# Patient Record
Sex: MALE | Race: WHITE | URBAN - METROPOLITAN AREA
[De-identification: names, ages, dates, MRNs, and addresses within clinical notes are randomized per-mention and may not be internally consistent; named-entity substitution may affect disease eponyms.]

---

## 2019-10-29 ENCOUNTER — HOSPITAL ENCOUNTER (EMERGENCY)
Age: 39
Discharge: HOME | End: 2019-10-29
Payer: COMMERCIAL

## 2019-10-29 VITALS
OXYGEN SATURATION: 95 % | HEART RATE: 98 BPM | TEMPERATURE: 98.2 F | DIASTOLIC BLOOD PRESSURE: 82 MMHG | SYSTOLIC BLOOD PRESSURE: 130 MMHG | RESPIRATION RATE: 18 BRPM

## 2019-10-29 DIAGNOSIS — L08.9 INFECTED LACERATION: Primary | ICD-10-CM

## 2019-10-29 DIAGNOSIS — Z23 NEED FOR TETANUS BOOSTER: ICD-10-CM

## 2019-10-29 DIAGNOSIS — T14.8XXA INFECTED LACERATION: Primary | ICD-10-CM

## 2019-10-29 PROCEDURE — 6360000002 HC RX W HCPCS: Performed by: PHYSICIAN ASSISTANT

## 2019-10-29 PROCEDURE — 90471 IMMUNIZATION ADMIN: CPT | Performed by: PHYSICIAN ASSISTANT

## 2019-10-29 PROCEDURE — 90715 TDAP VACCINE 7 YRS/> IM: CPT | Performed by: PHYSICIAN ASSISTANT

## 2019-10-29 PROCEDURE — 6370000000 HC RX 637 (ALT 250 FOR IP): Performed by: PHYSICIAN ASSISTANT

## 2019-10-29 PROCEDURE — 99282 EMERGENCY DEPT VISIT SF MDM: CPT

## 2019-10-29 RX ORDER — CEPHALEXIN 500 MG/1
500 CAPSULE ORAL 4 TIMES DAILY
Qty: 40 CAPSULE | Refills: 0 | Status: SHIPPED | OUTPATIENT
Start: 2019-10-29 | End: 2019-11-08

## 2019-10-29 RX ORDER — IBUPROFEN 800 MG/1
800 TABLET ORAL ONCE
Status: COMPLETED | OUTPATIENT
Start: 2019-10-29 | End: 2019-10-29

## 2019-10-29 RX ORDER — CEPHALEXIN 250 MG/1
500 CAPSULE ORAL ONCE
Status: COMPLETED | OUTPATIENT
Start: 2019-10-29 | End: 2019-10-29

## 2019-10-29 RX ORDER — SULFAMETHOXAZOLE AND TRIMETHOPRIM 800; 160 MG/1; MG/1
1 TABLET ORAL 2 TIMES DAILY
Qty: 20 TABLET | Refills: 0 | Status: SHIPPED | OUTPATIENT
Start: 2019-10-29 | End: 2019-11-08

## 2019-10-29 RX ORDER — SULFAMETHOXAZOLE AND TRIMETHOPRIM 800; 160 MG/1; MG/1
1 TABLET ORAL ONCE
Status: COMPLETED | OUTPATIENT
Start: 2019-10-29 | End: 2019-10-29

## 2019-10-29 RX ADMIN — IBUPROFEN 800 MG: 800 TABLET, FILM COATED ORAL at 16:17

## 2019-10-29 RX ADMIN — TETANUS TOXOID, REDUCED DIPHTHERIA TOXOID AND ACELLULAR PERTUSSIS VACCINE, ADSORBED 0.5 ML: 5; 2.5; 8; 8; 2.5 SUSPENSION INTRAMUSCULAR at 16:05

## 2019-10-29 RX ADMIN — SULFAMETHOXAZOLE AND TRIMETHOPRIM 1 TABLET: 800; 160 TABLET ORAL at 16:05

## 2019-10-29 RX ADMIN — CEPHALEXIN 500 MG: 250 CAPSULE ORAL at 16:05

## 2019-10-29 ASSESSMENT — PAIN SCALES - GENERAL
PAINLEVEL_OUTOF10: 5
PAINLEVEL_OUTOF10: 5

## 2019-10-29 ASSESSMENT — ENCOUNTER SYMPTOMS
NAUSEA: 0
COUGH: 0
WHEEZING: 0
VOMITING: 0
ABDOMINAL PAIN: 0
RHINORRHEA: 0
SHORTNESS OF BREATH: 0
DIARRHEA: 0

## 2019-10-29 ASSESSMENT — PAIN DESCRIPTION - PAIN TYPE: TYPE: CHRONIC PAIN;ACUTE PAIN

## 2020-04-24 ENCOUNTER — HOSPITAL ENCOUNTER (EMERGENCY)
Age: 40
Discharge: HOME | End: 2020-04-24
Attending: EMERGENCY MEDICINE
Payer: COMMERCIAL

## 2020-04-24 VITALS
BODY MASS INDEX: 29.8 KG/M2 | OXYGEN SATURATION: 99 % | HEIGHT: 72 IN | HEART RATE: 88 BPM | TEMPERATURE: 97.9 F | DIASTOLIC BLOOD PRESSURE: 95 MMHG | WEIGHT: 220 LBS | SYSTOLIC BLOOD PRESSURE: 167 MMHG | RESPIRATION RATE: 18 BRPM

## 2020-04-24 PROCEDURE — 99282 EMERGENCY DEPT VISIT SF MDM: CPT

## 2020-04-24 ASSESSMENT — ENCOUNTER SYMPTOMS
RHINORRHEA: 0
DIARRHEA: 0
NAUSEA: 0
COUGH: 0
VOMITING: 0
SHORTNESS OF BREATH: 0
ABDOMINAL PAIN: 0

## 2020-04-25 NOTE — ED PROVIDER NOTES
range of motion and neck supple. Trachea: No tracheal deviation. Cardiovascular:      Rate and Rhythm: Normal rate and regular rhythm. Heart sounds: No murmur. Pulmonary:      Effort: Pulmonary effort is normal. No respiratory distress. Breath sounds: Normal breath sounds. No wheezing or rales. Abdominal:      General: Bowel sounds are normal. There is no distension. Palpations: Abdomen is soft. Tenderness: There is no abdominal tenderness. There is no guarding. Musculoskeletal: Normal range of motion. Skin:     General: Skin is warm and dry. Neurological:      Mental Status: He is alert and oriented to person, place, and time. GCS: GCS eye subscore is 4. GCS verbal subscore is 5. GCS motor subscore is 6. Psychiatric:         Mood and Affect: Mood and affect normal.         Speech: Speech normal.         Behavior: Behavior normal. Behavior is cooperative. Thought Content: Thought content normal.         Cognition and Memory: Cognition normal.         Judgment: Judgment normal.         DIAGNOSTIC RESULTS   LABS:    Labs Reviewed - No data to display    All other labs were within normal range or not returned as of this dictation. EKG: All EKG's are interpreted by the Emergency Department Physician in the absence of a cardiologist.  Please see their note for interpretation of EKG. RADIOLOGY:   Non-plain film images such as CT, Ultrasound and MRI are read by the radiologist. Plain radiographic images are visualized and preliminarily interpreted by the ED Provider with the below findings:        Interpretation per the Radiologist below, if available at the time of this note:    No orders to display     No results found.         PROCEDURES   Unless otherwise noted below, none     Procedures    CRITICAL CARE TIME   N/A    CONSULTS:  None      EMERGENCY DEPARTMENT COURSE and DIFFERENTIAL DIAGNOSIS/MDM:   Vitals:    Vitals:    04/24/20 2024   BP: (!) 167/95   Pulse:

## 2020-05-24 ENCOUNTER — APPOINTMENT (OUTPATIENT)
Dept: GENERAL RADIOLOGY | Age: 40
End: 2020-05-24
Payer: COMMERCIAL

## 2020-05-24 ENCOUNTER — APPOINTMENT (OUTPATIENT)
Dept: CT IMAGING | Age: 40
End: 2020-05-24
Payer: COMMERCIAL

## 2020-05-24 ENCOUNTER — HOSPITAL ENCOUNTER (EMERGENCY)
Age: 40
Discharge: PSYCHIATRIC OR SUBSTANCE USE FACILITY | End: 2020-05-25
Attending: EMERGENCY MEDICINE
Payer: COMMERCIAL

## 2020-05-24 LAB
A/G RATIO: 1.6 (ref 1.1–2.2)
ACETAMINOPHEN LEVEL: <5 UG/ML (ref 10–30)
ALBUMIN SERPL-MCNC: 4.2 G/DL (ref 3.4–5)
ALP BLD-CCNC: 52 U/L (ref 40–129)
ALT SERPL-CCNC: 26 U/L (ref 10–40)
AMPHETAMINE SCREEN, URINE: NORMAL
ANION GAP SERPL CALCULATED.3IONS-SCNC: 8 MMOL/L (ref 3–16)
AST SERPL-CCNC: 21 U/L (ref 15–37)
BARBITURATE SCREEN URINE: NORMAL
BASOPHILS ABSOLUTE: 0 K/UL (ref 0–0.2)
BASOPHILS RELATIVE PERCENT: 0.5 %
BENZODIAZEPINE SCREEN, URINE: NORMAL
BILIRUB SERPL-MCNC: <0.2 MG/DL (ref 0–1)
BILIRUBIN URINE: NEGATIVE
BLOOD, URINE: NEGATIVE
BUN BLDV-MCNC: 19 MG/DL (ref 7–20)
CALCIUM SERPL-MCNC: 9.2 MG/DL (ref 8.3–10.6)
CANNABINOID SCREEN URINE: NORMAL
CHLORIDE BLD-SCNC: 104 MMOL/L (ref 99–110)
CLARITY: CLEAR
CO2: 24 MMOL/L (ref 21–32)
COCAINE METABOLITE SCREEN URINE: NORMAL
COLOR: YELLOW
CREAT SERPL-MCNC: 0.9 MG/DL (ref 0.9–1.3)
EOSINOPHILS ABSOLUTE: 0.7 K/UL (ref 0–0.6)
EOSINOPHILS RELATIVE PERCENT: 6.7 %
ETHANOL: NORMAL MG/DL (ref 0–0.08)
GFR AFRICAN AMERICAN: >60
GFR NON-AFRICAN AMERICAN: >60
GLOBULIN: 2.7 G/DL
GLUCOSE BLD-MCNC: 95 MG/DL (ref 70–99)
GLUCOSE URINE: NEGATIVE MG/DL
HCT VFR BLD CALC: 46.5 % (ref 40.5–52.5)
HEMOGLOBIN: 16.2 G/DL (ref 13.5–17.5)
KETONES, URINE: NEGATIVE MG/DL
LEUKOCYTE ESTERASE, URINE: NEGATIVE
LYMPHOCYTES ABSOLUTE: 1.7 K/UL (ref 1–5.1)
LYMPHOCYTES RELATIVE PERCENT: 17 %
Lab: NORMAL
MCH RBC QN AUTO: 31.6 PG (ref 26–34)
MCHC RBC AUTO-ENTMCNC: 34.9 G/DL (ref 31–36)
MCV RBC AUTO: 90.6 FL (ref 80–100)
METHADONE SCREEN, URINE: NORMAL
MICROSCOPIC EXAMINATION: NORMAL
MONOCYTES ABSOLUTE: 0.6 K/UL (ref 0–1.3)
MONOCYTES RELATIVE PERCENT: 5.8 %
NEUTROPHILS ABSOLUTE: 7.1 K/UL (ref 1.7–7.7)
NEUTROPHILS RELATIVE PERCENT: 70 %
NITRITE, URINE: NEGATIVE
OPIATE SCREEN URINE: NORMAL
OXYCODONE URINE: NORMAL
PDW BLD-RTO: 12.9 % (ref 12.4–15.4)
PH UA: 6.5
PH UA: 6.5 (ref 5–8)
PHENCYCLIDINE SCREEN URINE: NORMAL
PLATELET # BLD: 170 K/UL (ref 135–450)
PMV BLD AUTO: 8.7 FL (ref 5–10.5)
POTASSIUM SERPL-SCNC: 4.1 MMOL/L (ref 3.5–5.1)
PROPOXYPHENE SCREEN: NORMAL
PROTEIN UA: NEGATIVE MG/DL
RBC # BLD: 5.13 M/UL (ref 4.2–5.9)
SALICYLATE, SERUM: <0.3 MG/DL (ref 15–30)
SODIUM BLD-SCNC: 136 MMOL/L (ref 136–145)
SPECIFIC GRAVITY UA: 1.02 (ref 1–1.03)
TOTAL PROTEIN: 6.9 G/DL (ref 6.4–8.2)
URINE REFLEX TO CULTURE: NORMAL
URINE TYPE: NORMAL
UROBILINOGEN, URINE: 0.2 E.U./DL
WBC # BLD: 10.2 K/UL (ref 4–11)

## 2020-05-24 PROCEDURE — 85025 COMPLETE CBC W/AUTO DIFF WBC: CPT

## 2020-05-24 PROCEDURE — 80053 COMPREHEN METABOLIC PANEL: CPT

## 2020-05-24 PROCEDURE — G0480 DRUG TEST DEF 1-7 CLASSES: HCPCS

## 2020-05-24 PROCEDURE — 70450 CT HEAD/BRAIN W/O DYE: CPT

## 2020-05-24 PROCEDURE — 99285 EMERGENCY DEPT VISIT HI MDM: CPT

## 2020-05-24 PROCEDURE — 36415 COLL VENOUS BLD VENIPUNCTURE: CPT

## 2020-05-24 PROCEDURE — 80307 DRUG TEST PRSMV CHEM ANLYZR: CPT

## 2020-05-24 PROCEDURE — 93005 ELECTROCARDIOGRAM TRACING: CPT | Performed by: PHYSICIAN ASSISTANT

## 2020-05-24 PROCEDURE — 81003 URINALYSIS AUTO W/O SCOPE: CPT

## 2020-05-24 PROCEDURE — 71045 X-RAY EXAM CHEST 1 VIEW: CPT

## 2020-05-24 ASSESSMENT — ENCOUNTER SYMPTOMS
VOMITING: 0
DIARRHEA: 0
NAUSEA: 0
SHORTNESS OF BREATH: 0
WHEEZING: 0
ABDOMINAL PAIN: 0
COUGH: 0
RHINORRHEA: 0

## 2020-05-25 VITALS
BODY MASS INDEX: 29.8 KG/M2 | OXYGEN SATURATION: 97 % | WEIGHT: 220 LBS | HEART RATE: 71 BPM | RESPIRATION RATE: 16 BRPM | TEMPERATURE: 98.2 F | HEIGHT: 72 IN | DIASTOLIC BLOOD PRESSURE: 82 MMHG | SYSTOLIC BLOOD PRESSURE: 124 MMHG

## 2020-05-25 LAB
EKG ATRIAL RATE: 87 BPM
EKG DIAGNOSIS: NORMAL
EKG P AXIS: 53 DEGREES
EKG P-R INTERVAL: 144 MS
EKG Q-T INTERVAL: 358 MS
EKG QRS DURATION: 88 MS
EKG QTC CALCULATION (BAZETT): 430 MS
EKG R AXIS: 42 DEGREES
EKG T AXIS: 42 DEGREES
EKG VENTRICULAR RATE: 87 BPM

## 2020-05-25 PROCEDURE — 93010 ELECTROCARDIOGRAM REPORT: CPT | Performed by: INTERNAL MEDICINE

## 2020-05-25 NOTE — ED NOTES
Pt resting comfortably in bed, room safe, VSS,  at bedside, will continue to monitor.       Holli Encarnacion RN  05/24/20 4678

## 2020-05-25 NOTE — ED NOTES
Pt resting comfortably in bed, room safe, VSS,  at bedside, will continue to monitor.       Holli Encarnacion RN  05/25/20 4438

## 2020-05-25 NOTE — ED PROVIDER NOTES
methamphetamines. He denies any current illicit drug use. He has no physical complaints or other concerns at this time. Nursing Notes were all reviewed and agreed with or any disagreements were addressed in the HPI. REVIEW OF SYSTEMS    (2-9 systems for level 4, 10 or more for level 5)     Review of Systems   Unable to perform ROS: Mental status change   Constitutional: Negative for appetite change, chills and fever. HENT: Negative for congestion and rhinorrhea. Respiratory: Negative for cough, shortness of breath and wheezing. Cardiovascular: Negative for chest pain. Gastrointestinal: Negative for abdominal pain, diarrhea, nausea and vomiting. Genitourinary: Negative for difficulty urinating, dysuria and hematuria. Musculoskeletal: Negative for neck pain and neck stiffness. Skin: Negative for rash. Neurological: Negative for headaches. Positives and Pertinent negatives as per HPI. Except as noted above in the ROS, all other systems were reviewed and negative. PAST MEDICAL HISTORY   No past medical history on file. SURGICAL HISTORY     Past Surgical History:   Procedure Laterality Date    KNEE SURGERY           CURRENTMEDICATIONS       Previous Medications    DICYCLOMINE (BENTYL) 10 MG CAPSULE    Take 1 capsule by mouth 4 times daily (before meals and nightly) for 20 doses. ALLERGIES     Patient has no known allergies. FAMILYHISTORY     No family history on file.        SOCIAL HISTORY       Social History     Tobacco Use    Smoking status: Current Every Day Smoker     Packs/day: 0.50    Smokeless tobacco: Never Used   Substance Use Topics    Alcohol use: Yes     Comment: drinks five days a week - 12 pk beer    Drug use: No     Comment: 'today was first time with heroin'       SCREENINGS             PHYSICAL EXAM    (up to 7 for level 4, 8 or more for level 5)     ED Triage Vitals [05/24/20 2026]   BP Temp Temp Source Pulse Resp SpO2 Height Weight   (!) 154/102 98.3 °F (36.8 °C) Oral 92 16 96 % 6' (1.829 m) 220 lb (99.8 kg)       Physical Exam  Vitals signs and nursing note reviewed. Constitutional:       General: He is not in acute distress. Appearance: He is well-developed. He is not ill-appearing, toxic-appearing or diaphoretic. HENT:      Head: Normocephalic and atraumatic. Right Ear: External ear normal.      Left Ear: External ear normal.      Nose: Nose normal.   Eyes:      General:         Right eye: No discharge. Left eye: No discharge. Conjunctiva/sclera: Conjunctivae normal.      Pupils: Pupils are equal, round, and reactive to light. Neck:      Musculoskeletal: Normal range of motion and neck supple. Cardiovascular:      Rate and Rhythm: Normal rate and regular rhythm. Heart sounds: Normal heart sounds. Pulmonary:      Effort: Pulmonary effort is normal. No respiratory distress. Breath sounds: Normal breath sounds. Chest:      Chest wall: No tenderness. Abdominal:      General: Bowel sounds are normal. There is no distension. Palpations: Abdomen is soft. Tenderness: There is no abdominal tenderness. Musculoskeletal: Normal range of motion. Skin:     General: Skin is warm and dry. Neurological:      Mental Status: He is alert and oriented to person, place, and time. GCS: GCS eye subscore is 4. GCS verbal subscore is 5. GCS motor subscore is 6. Psychiatric:         Attention and Perception: He perceives auditory hallucinations. Speech: Speech is rapid and pressured and tangential.         Behavior: Behavior normal.         Thought Content: Thought content is paranoid and delusional. Thought content does not include homicidal or suicidal ideation. Thought content does not include homicidal or suicidal plan.          DIAGNOSTIC RESULTS   LABS:    Labs Reviewed   CBC WITH AUTO DIFFERENTIAL - Abnormal; Notable for the following components:       Result Value    Eosinophils Absolute 1. Paranoid psychosis ([facility] [address]    2. Auditory hallucination          DISPOSITION/PLAN   DISPOSITION Decision To Transfer 05/24/2020 09:57:16 PM      PATIENT REFERREDTO:  No follow-up provider specified.     DISCHARGE MEDICATIONS:  New Prescriptions    No medications on file       DISCONTINUED MEDICATIONS:  Discontinued Medications    No medications on file              (Please note that portions of this note were completed with a voice recognition program.  Efforts were made to edit the dictations but occasionally words are mis-transcribed.)    Blanche Estevez PA-C (electronically signed)           Bj Moulton PA-C  05/24/20 4733

## 2020-05-25 NOTE — ED NOTES
Pt resting comfortably in bed, room safe, VSS,  at bedside, will continue to monitor.       Chelsey Quinteros RN  05/25/20 6283

## 2020-11-06 ENCOUNTER — HOSPITAL ENCOUNTER (OUTPATIENT)
Age: 40
Discharge: HOME | End: 2020-11-06
Payer: COMMERCIAL

## 2020-11-06 LAB
A/G RATIO: 1.6 (ref 1.1–2.2)
ALBUMIN SERPL-MCNC: 4.4 G/DL (ref 3.4–5)
ALP BLD-CCNC: 51 U/L (ref 40–129)
ALT SERPL-CCNC: 20 U/L (ref 10–40)
ANION GAP SERPL CALCULATED.3IONS-SCNC: 9 MMOL/L (ref 3–16)
AST SERPL-CCNC: 16 U/L (ref 15–37)
BASOPHILS ABSOLUTE: 0.1 K/UL (ref 0–0.2)
BASOPHILS RELATIVE PERCENT: 0.6 %
BILIRUB SERPL-MCNC: <0.2 MG/DL (ref 0–1)
BUN BLDV-MCNC: 12 MG/DL (ref 7–20)
CALCIUM SERPL-MCNC: 9 MG/DL (ref 8.3–10.6)
CHLORIDE BLD-SCNC: 103 MMOL/L (ref 99–110)
CO2: 24 MMOL/L (ref 21–32)
CREAT SERPL-MCNC: 0.7 MG/DL (ref 0.9–1.3)
EOSINOPHILS ABSOLUTE: 0.2 K/UL (ref 0–0.6)
EOSINOPHILS RELATIVE PERCENT: 2.7 %
GFR AFRICAN AMERICAN: >60
GFR NON-AFRICAN AMERICAN: >60
GLOBULIN: 2.7 G/DL
GLUCOSE BLD-MCNC: 99 MG/DL (ref 70–99)
HCT VFR BLD CALC: 44.6 % (ref 40.5–52.5)
HEMOGLOBIN: 15.8 G/DL (ref 13.5–17.5)
LYMPHOCYTES ABSOLUTE: 1 K/UL (ref 1–5.1)
LYMPHOCYTES RELATIVE PERCENT: 11.2 %
MCH RBC QN AUTO: 31.6 PG (ref 26–34)
MCHC RBC AUTO-ENTMCNC: 35.5 G/DL (ref 31–36)
MCV RBC AUTO: 89.1 FL (ref 80–100)
MONOCYTES ABSOLUTE: 0.3 K/UL (ref 0–1.3)
MONOCYTES RELATIVE PERCENT: 3.6 %
NEUTROPHILS ABSOLUTE: 7 K/UL (ref 1.7–7.7)
NEUTROPHILS RELATIVE PERCENT: 81.9 %
PDW BLD-RTO: 13.1 % (ref 12.4–15.4)
PLATELET # BLD: 170 K/UL (ref 135–450)
PMV BLD AUTO: 9.1 FL (ref 5–10.5)
POTASSIUM SERPL-SCNC: 4.1 MMOL/L (ref 3.5–5.1)
RBC # BLD: 5.01 M/UL (ref 4.2–5.9)
SODIUM BLD-SCNC: 136 MMOL/L (ref 136–145)
TOTAL PROTEIN: 7.1 G/DL (ref 6.4–8.2)
WBC # BLD: 8.5 K/UL (ref 4–11)

## 2020-11-06 PROCEDURE — 80053 COMPREHEN METABOLIC PANEL: CPT

## 2020-11-06 PROCEDURE — 85025 COMPLETE CBC W/AUTO DIFF WBC: CPT

## 2020-11-06 PROCEDURE — 36415 COLL VENOUS BLD VENIPUNCTURE: CPT

## 2020-12-14 ENCOUNTER — HOSPITAL ENCOUNTER (INPATIENT)
Age: 40
LOS: 3 days | Discharge: HOME | DRG: 750 | End: 2020-12-17
Attending: PSYCHIATRY & NEUROLOGY | Admitting: PSYCHIATRY & NEUROLOGY
Payer: COMMERCIAL

## 2020-12-14 ENCOUNTER — HOSPITAL ENCOUNTER (EMERGENCY)
Age: 40
Discharge: PSYCHIATRIC OR SUBSTANCE USE FACILITY | End: 2020-12-14
Attending: EMERGENCY MEDICINE
Payer: COMMERCIAL

## 2020-12-14 VITALS
RESPIRATION RATE: 16 BRPM | DIASTOLIC BLOOD PRESSURE: 79 MMHG | WEIGHT: 225 LBS | BODY MASS INDEX: 29.82 KG/M2 | HEART RATE: 103 BPM | SYSTOLIC BLOOD PRESSURE: 131 MMHG | OXYGEN SATURATION: 94 % | HEIGHT: 73 IN | TEMPERATURE: 97.7 F

## 2020-12-14 PROBLEM — F29 PSYCHOSIS (HCC): Status: ACTIVE | Noted: 2020-12-14

## 2020-12-14 LAB
A/G RATIO: 1.5 (ref 1.1–2.2)
ACETAMINOPHEN LEVEL: <5 UG/ML (ref 10–30)
ALBUMIN SERPL-MCNC: 5 G/DL (ref 3.4–5)
ALP BLD-CCNC: 73 U/L (ref 40–129)
ALT SERPL-CCNC: 23 U/L (ref 10–40)
ANION GAP SERPL CALCULATED.3IONS-SCNC: 13 MMOL/L (ref 3–16)
AST SERPL-CCNC: 24 U/L (ref 15–37)
BASOPHILS ABSOLUTE: 0.1 K/UL (ref 0–0.2)
BASOPHILS RELATIVE PERCENT: 0.5 %
BILIRUB SERPL-MCNC: 1.1 MG/DL (ref 0–1)
BUN BLDV-MCNC: 12 MG/DL (ref 7–20)
CALCIUM SERPL-MCNC: 10 MG/DL (ref 8.3–10.6)
CHLORIDE BLD-SCNC: 98 MMOL/L (ref 99–110)
CO2: 23 MMOL/L (ref 21–32)
CREAT SERPL-MCNC: 0.8 MG/DL (ref 0.9–1.3)
EOSINOPHILS ABSOLUTE: 0.3 K/UL (ref 0–0.6)
EOSINOPHILS RELATIVE PERCENT: 2.9 %
ETHANOL: NORMAL MG/DL (ref 0–0.08)
GFR AFRICAN AMERICAN: >60
GFR NON-AFRICAN AMERICAN: >60
GLOBULIN: 3.3 G/DL
GLUCOSE BLD-MCNC: 117 MG/DL (ref 70–99)
HCT VFR BLD CALC: 46.8 % (ref 40.5–52.5)
HEMOGLOBIN: 16.4 G/DL (ref 13.5–17.5)
LYMPHOCYTES ABSOLUTE: 1.2 K/UL (ref 1–5.1)
LYMPHOCYTES RELATIVE PERCENT: 10.5 %
MCH RBC QN AUTO: 30.5 PG (ref 26–34)
MCHC RBC AUTO-ENTMCNC: 35 G/DL (ref 31–36)
MCV RBC AUTO: 87 FL (ref 80–100)
MONOCYTES ABSOLUTE: 0.8 K/UL (ref 0–1.3)
MONOCYTES RELATIVE PERCENT: 6.6 %
NEUTROPHILS ABSOLUTE: 9.1 K/UL (ref 1.7–7.7)
NEUTROPHILS RELATIVE PERCENT: 79.5 %
PDW BLD-RTO: 12.9 % (ref 12.4–15.4)
PLATELET # BLD: 192 K/UL (ref 135–450)
PMV BLD AUTO: 8.4 FL (ref 5–10.5)
POTASSIUM REFLEX MAGNESIUM: 3.7 MMOL/L (ref 3.5–5.1)
RBC # BLD: 5.38 M/UL (ref 4.2–5.9)
REASON FOR REJECTION: NORMAL
REJECTED TEST: NORMAL
SALICYLATE, SERUM: 0.4 MG/DL (ref 15–30)
SARS-COV-2, NAAT: NOT DETECTED
SODIUM BLD-SCNC: 134 MMOL/L (ref 136–145)
TOTAL PROTEIN: 8.3 G/DL (ref 6.4–8.2)
WBC # BLD: 11.5 K/UL (ref 4–11)

## 2020-12-14 PROCEDURE — 80053 COMPREHEN METABOLIC PANEL: CPT

## 2020-12-14 PROCEDURE — 80061 LIPID PANEL: CPT

## 2020-12-14 PROCEDURE — 36415 COLL VENOUS BLD VENIPUNCTURE: CPT

## 2020-12-14 PROCEDURE — 1240000000 HC EMOTIONAL WELLNESS R&B

## 2020-12-14 PROCEDURE — 85025 COMPLETE CBC W/AUTO DIFF WBC: CPT

## 2020-12-14 PROCEDURE — 93005 ELECTROCARDIOGRAM TRACING: CPT | Performed by: EMERGENCY MEDICINE

## 2020-12-14 PROCEDURE — 6360000002 HC RX W HCPCS: Performed by: EMERGENCY MEDICINE

## 2020-12-14 PROCEDURE — G0480 DRUG TEST DEF 1-7 CLASSES: HCPCS

## 2020-12-14 PROCEDURE — 99285 EMERGENCY DEPT VISIT HI MDM: CPT

## 2020-12-14 PROCEDURE — 83036 HEMOGLOBIN GLYCOSYLATED A1C: CPT

## 2020-12-14 PROCEDURE — 96372 THER/PROPH/DIAG INJ SC/IM: CPT

## 2020-12-14 PROCEDURE — 6370000000 HC RX 637 (ALT 250 FOR IP): Performed by: PSYCHIATRY & NEUROLOGY

## 2020-12-14 PROCEDURE — U0002 COVID-19 LAB TEST NON-CDC: HCPCS

## 2020-12-14 PROCEDURE — 84443 ASSAY THYROID STIM HORMONE: CPT

## 2020-12-14 RX ORDER — RISPERIDONE 1 MG/1
1 TABLET, FILM COATED ORAL 2 TIMES DAILY
Status: DISCONTINUED | OUTPATIENT
Start: 2020-12-14 | End: 2020-12-15

## 2020-12-14 RX ORDER — NICOTINE 21 MG/24HR
1 PATCH, TRANSDERMAL 24 HOURS TRANSDERMAL DAILY
Status: DISCONTINUED | OUTPATIENT
Start: 2020-12-15 | End: 2020-12-17 | Stop reason: HOSPADM

## 2020-12-14 RX ORDER — IBUPROFEN 400 MG/1
400 TABLET ORAL EVERY 6 HOURS PRN
Status: DISCONTINUED | OUTPATIENT
Start: 2020-12-14 | End: 2020-12-17 | Stop reason: HOSPADM

## 2020-12-14 RX ORDER — RISPERIDONE 1 MG/1
TABLET, FILM COATED ORAL
Status: ON HOLD | COMMUNITY
Start: 2020-09-10 | End: 2020-12-17 | Stop reason: HOSPADM

## 2020-12-14 RX ORDER — DROPERIDOL 2.5 MG/ML
5 INJECTION, SOLUTION INTRAMUSCULAR; INTRAVENOUS EVERY 6 HOURS PRN
Status: DISCONTINUED | OUTPATIENT
Start: 2020-12-14 | End: 2020-12-14 | Stop reason: HOSPADM

## 2020-12-14 RX ORDER — POLYETHYLENE GLYCOL 3350 17 G
2 POWDER IN PACKET (EA) ORAL
Status: DISCONTINUED | OUTPATIENT
Start: 2020-12-14 | End: 2020-12-17 | Stop reason: HOSPADM

## 2020-12-14 RX ORDER — MIDAZOLAM HYDROCHLORIDE 2 MG/2ML
2 INJECTION, SOLUTION INTRAMUSCULAR; INTRAVENOUS ONCE
Status: COMPLETED | OUTPATIENT
Start: 2020-12-14 | End: 2020-12-14

## 2020-12-14 RX ORDER — ACETAMINOPHEN 325 MG/1
650 TABLET ORAL EVERY 4 HOURS PRN
Status: DISCONTINUED | OUTPATIENT
Start: 2020-12-14 | End: 2020-12-17 | Stop reason: HOSPADM

## 2020-12-14 RX ORDER — TRAZODONE HYDROCHLORIDE 50 MG/1
50 TABLET ORAL NIGHTLY PRN
Status: DISCONTINUED | OUTPATIENT
Start: 2020-12-14 | End: 2020-12-17 | Stop reason: HOSPADM

## 2020-12-14 RX ADMIN — DROPERIDOL 5 MG: 2.5 INJECTION, SOLUTION INTRAMUSCULAR; INTRAVENOUS at 07:44

## 2020-12-14 RX ADMIN — MIDAZOLAM 2 MG: 1 INJECTION INTRAMUSCULAR; INTRAVENOUS at 07:45

## 2020-12-14 RX ADMIN — RISPERIDONE 1 MG: 1 TABLET ORAL at 22:45

## 2020-12-14 ASSESSMENT — SLEEP AND FATIGUE QUESTIONNAIRES
DIFFICULTY FALLING ASLEEP: YES
DO YOU HAVE DIFFICULTY SLEEPING: YES
DIFFICULTY STAYING ASLEEP: NO
SLEEP PATTERN: DIFFICULTY FALLING ASLEEP
DO YOU USE A SLEEP AID: NO
RESTFUL SLEEP: YES
DIFFICULTY ARISING: NO
AVERAGE NUMBER OF SLEEP HOURS: 10

## 2020-12-14 ASSESSMENT — LIFESTYLE VARIABLES: HISTORY_ALCOHOL_USE: NO

## 2020-12-14 NOTE — ED PROVIDER NOTES
eMERGENCY dEPARTMENT eNCOUnter      PtName: Judye Kayser  MRN: [id number]  [place] 1980  Date of evaluation: 12/14/2020  Provider: Jas Ferro DO     CHIEF COMPLAINT       Chief Complaint   Patient presents with   [address] police brought pt in because he states,\" I have a chip in my head and its torturing me. [employer] controls it. \"         HISTORY OF PRESENT ILLNESS   (Location/Symptom, Timing/Onset,Context/Setting, Quality, Duration, Modifying Factors, Severity) Note limiting factors. Judye Kayser is a 41 y.o. male who presents to the emergency department with chief complaint of hallucinations. History of prior. Unknown what medications he is on. He admits to having a chip in his head and having a direct line of communication with [employer]. He denies any visual hallucinations, suicidal ideations or homicidal ideations. History is limited secondary to patient's psychosis. Nursing Notes were reviewed. REVIEW OF SYSTEMS    (2+ forlevel 4; 10+ for level 5)     Review of Systems  See hpi for further details. Complete 10 point review of all systems performed and is otherwise negative unless noted above. PAST MEDICAL HISTORY     Past Medical History:   Diagnosis Date    Depression     Paranoia ([facility] [address]     Schizoaffective disorder, bipolar type ([facility] [address]        SURGICAL HISTORY       Past Surgical History:   Procedure Laterality Date    KNEE SURGERY         CURRENT MEDICATIONS       Previous Medications    No medications on file       ALLERGIES     Patient has no known allergies. FAMILY HISTORY     History reviewed. No pertinent family history.        SOCIAL HISTORY       Social History     Socioeconomic History    Marital status:      Spouse name: None    Number of children: None    Years of education: None    Highest education level: None   Occupational History    None   Social Needs    Financial resource strain: None    Food insecurity     Worry: None     Inability: None    Transportation needs     Medical: None     Non-medical: None   Tobacco Use    Smoking status: Current Every Day Smoker     Packs/day: 0.50    Smokeless tobacco: Never Used   Substance and Sexual Activity    Alcohol use: Yes     Comment: drinks five days a week - 12 pk beer    Drug use: Not Currently    Sexual activity: None   Lifestyle    Physical activity     Days per week: None     Minutes per session: None    Stress: None   Relationships    Social connections     Talks on phone: None     Gets together: None     Attends [employer] service: None     Active member of club or organization: None     Attends meetings of clubs or organizations: None     Relationship status: None    Intimate partner violence     Fear of current or ex partner: None     Emotionally abused: None     Physically abused: None     Forced sexual activity: None   Other Topics Concern    None   Social History Narrative    None       SCREENINGS    [place] Coma Scale  Eye Opening: Spontaneous  Best Verbal Response: Oriented  Best Motor Response: Obeys commands  [place] Coma Scale Score: 15      PHYSICAL EXAM    (5+ for level 4, 8+ for level 5)     ED Triage Vitals   BP Temp Temp src Pulse Resp SpO2 Height Weight   -- -- -- -- -- -- -- --       Physical Exam  Vitals signs and nursing note reviewed. Constitutional:       General: He is not in acute distress. Appearance: Normal appearance. He is well-developed. He is not ill-appearing, toxic-appearing or diaphoretic. HENT:      Head: Normocephalic and atraumatic. Right Ear: External ear normal.      Left Ear: External ear normal.      Nose: Nose normal.      Mouth/Throat:      Mouth: Mucous membranes are moist.      Pharynx: Oropharynx is clear. Eyes:      General: No scleral icterus. Right eye: No discharge. Left eye: No discharge.       Conjunctiva/sclera: Conjunctivae normal.      Pupils: Pupils are equal, round, and reactive to light. Neck:      Musculoskeletal: Normal range of motion and neck supple. Vascular: No JVD. Trachea: No tracheal deviation. Cardiovascular:      Pulses: Normal pulses. Pulmonary:      Effort: Pulmonary effort is normal. No respiratory distress. Breath sounds: No stridor. Abdominal:      General: Abdomen is flat. There is no distension. Palpations: Abdomen is soft. Musculoskeletal: Normal range of motion. General: No swelling, tenderness, deformity or signs of injury. Right lower leg: No edema. Left lower leg: No edema. Skin:     General: Skin is warm and dry. Capillary Refill: Capillary refill takes less than 2 seconds. Coloration: Skin is not jaundiced or pale. Findings: No bruising, erythema, lesion or rash. Neurological:      General: No focal deficit present. Mental Status: He is alert and oriented to person, place, and time. Cranial Nerves: No cranial nerve deficit. Sensory: No sensory deficit. Motor: No weakness or abnormal muscle tone. Coordination: Coordination normal.   Psychiatric:      Comments: Patient appears to be actively talking to a voice in his head. Appears to be actively psychotic. He is however calm and in bed. No suicidal or homicidal ideations. DIAGNOSTIC RESULTS     EKG: EKG interpretation by ED physician: Normal axis, normal sinus rhythm at a rate of 85 bpm. No ischemic ST changes. RADIOLOGY (Per Emergency Physician): Interpretation per the Radiologist below, if available at the time of this note:  No results found.     LABS:  Labs Reviewed   COMPREHENSIVE METABOLIC PANEL W/ REFLEX TO MG FOR LOW K - Abnormal; Notable for the following components:       Result Value    Sodium 134 (*)     Chloride 98 (*)     Glucose 117 (*)     CREATININE 0.8 (*)     Total Protein 8.3 (*)     Total Bilirubin 1.1 (*)     All other components within normal limits    Narrative: CALL  [facility] [phone],  Rejected Test Name/Called to: Joann Mtz rn, 12/14/2020 08:28, by [facility]  Performed at:  [facility]  [address],  [place], [address]   Phone [phone]   ACETAMINOPHEN LEVEL - Abnormal; Notable for the following components:    Acetaminophen Level <5 (*)     All other components within normal limits    Narrative:     Sissy John  [facility] tel. [phone],  Rejected Test Name/Called to: Jonan Mtz rn, 12/14/2020 08:28, by [facility]  Performed at:  [facility]  [address], [address]   Phone [phone]   SALICYLATE LEVEL - Abnormal; Notable for the following components:    Salicylate, Serum 0.4 (*)     All other components within normal limits    Narrative:     Sissy John  [facility] tel. [phone],  Rejected Test Name/Called to: Joann Mtz rn, 12/14/2020 08:28, by [facility]  Performed at:  [facility] [address],  [place], [address]   Phone [phone]   CBC WITH AUTO DIFFERENTIAL - Abnormal; Notable for the following components:    WBC 11.5 (*)     Neutrophils Absolute 9.1 (*)     All other components within normal limits    Narrative:     Performed at:  [facility] [address],  [place], [address]   Phone [phone]   ETHANOL    Narrative:     Boni Brunson tel. [phone],  Rejected Test Name/Called to: Joann Mtz rn, 12/14/2020 08:28, by [facility]  Performed at:  [facility]  [address], [address]   Phone [phone]    Narrative:     [phone] was cancelled on 12/14/2020 at 08:15 by [facility]; Cancelled: In-house  testing  Performed at:  [facility]  [address],  [place], [address]   Phone [phone]    Narrative:     Sissy [facility]CH[facility] tel. [phone],  Rejected Test Name/Called to: Joann Mtz rn, 12/14/2020 08:28, by [facility]  Performed at:  [facility] [address] [facility],  [place], [address]   Phone [phone]   [facility]       All other labs were within normal range or not returned as of this dictation. EMERGENCY DEPARTMENT COURSE and DIFFERENTIAL DIAGNOSIS/MDM:   Vitals:    Vitals:    12/14/20 0725   BP: (!) 146/101   Pulse: 108   Resp: 17   Temp: 97.7 °F (36.5 °C)   TempSrc: Oral   SpO2: 96%   Weight: 225 lb (102.1 kg)   Height: 6' 1\" (1.854 m)       Medications   droperidol (INAPSINE) injection 5 mg (5 mg Intramuscular Given 12/14/20 0744)   midazolam PF (VERSED) injection 2 mg (2 mg Intramuscular Given 12/14/20 0745)       MDM. Psych panel initiated. Due to patient's agitation droperidol and Versed was ordered. This seemed to improve the situation. Pink slip was filled out. Patient is medically clear for psych placement. Covid swab performed screening is negative. CRITICAL CARE TIME: 30 minutes excluding billable procedure time: Initiation of antipsychotics in a patient with acute agitation, multiple reevaluations, complex MDM, review and interpretation of results, charting. CONSULTS:  None    PROCEDURES:  Unless otherwise noted below, none     Procedures    FINAL IMPRESSION      1. Acute psychosis ([facility] [address]    2. Hallucinations          DISPOSITION/PLAN   DISPOSITION Decision To Transfer 12/14/2020 08:50:13 AM      PATIENT REFERRED TO:  No follow-up provider specified. DISCHARGE MEDICATIONS:  New Prescriptions    No medications on file          (Please note:  Portions of this note were completed with a voice recognition program. Efforts were made to edit the dictations but occasionally words and phrases are mis-transcribed.)    Form v2016. J.[id number]    Avelina Davis DO (electronically signed)  Emergency Medicine Provider              Gurinder Clayton DO  12/14/20 [address] [place] [address],   12/14/20 311

## 2020-12-14 NOTE — ED NOTES
Mom called, update given, she is still trying to get in contact with pt's psychiatrist Dr Yee Randle, RN  12/14/20 6341

## 2020-12-14 NOTE — ED NOTES
I contacted pt's mom who was listed as a Emergency Contact, she states he usually goes to [place], was in there over a week ago for 3 days. She reports he takes one pill a day but does not know what he takes. She is unsure of his actual mental health diagnosis  I updated her on plan of care, she reports he sees Dr Merry Chen with psychiatry.        Hieu Tang RN  12/14/20 4476

## 2020-12-14 NOTE — ED NOTES
Patient resting at this time. Will come back to provide inpatient and outpatient resources for mental health and alcohol and drug treatment services.       Mariam Mo  12/14/20 1002

## 2020-12-15 LAB
EKG ATRIAL RATE: 85 BPM
EKG DIAGNOSIS: NORMAL
EKG P AXIS: 48 DEGREES
EKG P-R INTERVAL: 140 MS
EKG Q-T INTERVAL: 386 MS
EKG QRS DURATION: 84 MS
EKG QTC CALCULATION (BAZETT): 459 MS
EKG R AXIS: 43 DEGREES
EKG T AXIS: 59 DEGREES
EKG VENTRICULAR RATE: 85 BPM

## 2020-12-15 PROCEDURE — 99221 1ST HOSP IP/OBS SF/LOW 40: CPT | Performed by: PHYSICIAN ASSISTANT

## 2020-12-15 PROCEDURE — 6370000000 HC RX 637 (ALT 250 FOR IP): Performed by: PSYCHIATRY & NEUROLOGY

## 2020-12-15 PROCEDURE — 99223 1ST HOSP IP/OBS HIGH 75: CPT | Performed by: PSYCHIATRY & NEUROLOGY

## 2020-12-15 PROCEDURE — 1240000000 HC EMOTIONAL WELLNESS R&B

## 2020-12-15 PROCEDURE — 93010 ELECTROCARDIOGRAM REPORT: CPT | Performed by: INTERNAL MEDICINE

## 2020-12-15 RX ORDER — DIMETHICONE, OXYBENZONE, AND PADIMATE O 2; 2.5; 6.6 G/100G; G/100G; G/100G
STICK TOPICAL PRN
Status: DISCONTINUED | OUTPATIENT
Start: 2020-12-15 | End: 2020-12-17 | Stop reason: HOSPADM

## 2020-12-15 RX ORDER — HYDROXYZINE PAMOATE 50 MG/1
50 CAPSULE ORAL EVERY 6 HOURS PRN
Status: DISCONTINUED | OUTPATIENT
Start: 2020-12-15 | End: 2020-12-16

## 2020-12-15 RX ORDER — OLANZAPINE 10 MG/1
10 TABLET, ORALLY DISINTEGRATING ORAL EVERY 6 HOURS PRN
Status: DISCONTINUED | OUTPATIENT
Start: 2020-12-15 | End: 2020-12-16

## 2020-12-15 RX ORDER — LORAZEPAM 2 MG/1
2 TABLET ORAL EVERY 6 HOURS PRN
Status: DISCONTINUED | OUTPATIENT
Start: 2020-12-15 | End: 2020-12-16

## 2020-12-15 RX ORDER — RISPERIDONE 2 MG/1
2 TABLET, FILM COATED ORAL 2 TIMES DAILY
Status: DISCONTINUED | OUTPATIENT
Start: 2020-12-15 | End: 2020-12-16

## 2020-12-15 RX ADMIN — RISPERIDONE 1 MG: 1 TABLET ORAL at 08:57

## 2020-12-15 RX ADMIN — HYDROXYZINE PAMOATE 50 MG: 50 CAPSULE ORAL at 18:48

## 2020-12-15 RX ADMIN — RISPERIDONE 2 MG: 2 TABLET ORAL at 21:49

## 2020-12-15 RX ADMIN — NICOTINE POLACRILEX 2 MG: 2 LOZENGE ORAL at 19:27

## 2020-12-15 ASSESSMENT — LIFESTYLE VARIABLES: HISTORY_ALCOHOL_USE: NO

## 2020-12-15 ASSESSMENT — SLEEP AND FATIGUE QUESTIONNAIRES
RESTFUL SLEEP: YES
AVERAGE NUMBER OF SLEEP HOURS: 9
DIFFICULTY ARISING: NO
DO YOU USE A SLEEP AID: NO
DIFFICULTY STAYING ASLEEP: NO
DIFFICULTY FALLING ASLEEP: YES
DO YOU HAVE DIFFICULTY SLEEPING: YES
SLEEP PATTERN: RESTLESSNESS

## 2020-12-15 NOTE — H&P
Psychiatric Admission Evaluation       Admission Date:    12/14/2020  Identifying Info:   Patient is a 41 y.o. male with hx of psychosis   Patient was admitted to psychiatric unit on a voluntarily basis. Chief complaint:  Psychotic decompensation    HPI: 42 y/o wm with hx scz that presented to ed secondary to psychotic decompensation. Pt verbalized that he has been hearing voices and admitted to having a chip in hi head rgar allows him to have direct communication with president Thea Bryan. Pt stated that he has not been taking the risperdal consistently and admits that he starts talking to himself and hears voices when he starts missing doses. Pt stated that he feels meds are not working for him anymore and feels he needs a higher dose. Pt is somewhat guarded regarding his sx's and describes it as extreme anxiety because he is trying to runaway from his head. Pt stated that he cont to work and enjoys works and denies any neurovegetative sx's of depression and denies si/hi.      per collateral : Collateral from Mother Venancio Severs): I believe he has been diagnosed with schizophrenia. He has voices that talk to him. You can tell when the voices are talking to him because he doesn't answer and he seems far away. He asked the lady at the store to call the police. He admitted to the police that he believes he has a chip in his head, and that he was being listened to by the [facility]. I'm unsure if he has been taking his psych medication regularly or not. I would like him to start getting his medication in a shot. He stays with me only when I have his kids with me. His children's mother will let me come get the kids, and he will see them when I'm around. Past trama include: his best friend overdosed, mother of his children left him for another man, and he quit his job of 15 years. He overdosed once on heroin after the mother of his children left him. We don't think he does drugs anymore. He has been hearing voices for awhile. He had been normal for three weeks, and for the last week he hadn't been acting normal. He seemed like he was in a deep depression, and he seemed paranoid. He avoids me when he gets like this, because I would tell him to get help. He never admits to doctors when he is hearing voices. current medications    Prior to Admission medications    Medication Sig Start Date End Date Taking? Authorizing Provider   risperiDONE (RISPERDAL) 1 MG tablet TAKE 1 TABLET BY MOUTH TWO TIMES A DAY 9/10/20  Yes Historical Provider, MD       Past psychiatric and medical history:  Hosp:multiple hospitalizations, last admission a week ago [address] same presentation,trials risperdal. One previous suicide attemtp via heroin od [facility] [address] [place] [address] psychiatry [address] Jayde Mendoza. Substance Abuse History: denies current use. Did experimented in the past but will not specify Social Hx: Pt lives by himself but has support form mom. Pt has 2 children 6 and 8 y/o. Pt sees them with his mom. Pt works at [employer]. Pt is hs grad. No legal issues pending. Pt reports happy childhood and denies hx of abuse. Family Mental Health Hx:   None reported        Mental Status Examination:    Level of consciousness: Moderate dysattention (reduced clarity of awareness with impaired ability to focus, sustain, or shift attention) and awake  Appearance:  well-appearing, street clothes, lying in bed, good grooming and good hygiene well-developed, well-nourished  Behavior/Motor:  no abnormalities noted normal gait and station and normal balance AIMS: 0  Attitude toward examiner:  good eye contact and guarded  Speech:  spontaneous, normal rate and normal volume Mood:  anxious Affect:  blunted  Hallucinations: Present - would not say what voices are telling him Thought processes:  slow  Attention: attention span appeared shorter than expected for age Thought content:  paranoid thought insertion and delusions of control Abstraction: concrete  OCD: none   Insight: impaired insight Judgement: impaired judgment  Cognition:  oriented to person, place, and time  Fund of Knowledge: average   IQ: average Memory: intact  Suicide:  no specific plan to harm self Sleep: no sleep issues Appetite: ok     Inventory of strengths and weaknesses:Family support and Caregiver support  ROS:  See Medical H&PE    PE:  /83   Pulse 82   Temp 98.7 °F (37.1 °C) (Temporal)   Resp 16   Ht 6' 1\" (1.854 m)   Wt 225 lb (102.1 kg)   SpO2 96%   BMI 29.69 kg/m²     Cranial Nerves: 1-12 appear to be intact .   LAB:   Admission on 12/14/2020, Discharged on 12/14/2020   Component Date Value Ref Range Status    Sodium 12/14/2020 134* 136 - 145 mmol/L Final    Potassium reflex Magnesium 12/14/2020 3.7  3.5 - 5.1 mmol/L Final    Chloride 12/14/2020 98* 99 - 110 mmol/L Final    CO2 12/14/2020 23  21 - 32 mmol/L Final  Anion Gap 12/14/2020 13  3 - 16 Final    Glucose 12/14/2020 117* 70 - 99 mg/dL Final    BUN 12/14/2020 12  7 - 20 mg/dL Final    CREATININE 12/14/2020 0.8* 0.9 - 1.3 mg/dL Final    GFR Non- 12/14/2020 >60  >60 Final    Comment: >60 mL/min/1.73m2 EGFR, calc. for ages 25 and older using the  MDRD formula (not corrected for weight), is valid for stable  renal function.  GFR  12/14/2020 >60  >60 Final    Comment: Chronic Kidney Disease: less than 60 ml/min/1.73 sq.m. Kidney Failure: less than 15 ml/min/1.73 sq.m. Results valid for patients 18 years and older.  Calcium 12/14/2020 10.0  8.3 - 10.6 mg/dL Final    Total Protein 12/14/2020 8.3* 6.4 - 8.2 g/dL Final    Alb 12/14/2020 5.0  3.4 - 5.0 g/dL Final    Albumin/Globulin Ratio 12/14/2020 1.5  1.1 - 2.2 Final    Total Bilirubin 12/14/2020 1.1* 0.0 - 1.0 mg/dL Final    Alkaline Phosphatase 12/14/2020 73  40 - 129 U/L Final    ALT 12/14/2020 23  10 - 40 U/L Final    AST 12/14/2020 24  15 - 37 U/L Final    Globulin 12/14/2020 3.3  g/dL Final    Ethanol Lvl 12/14/2020 None Detected  mg/dL Final    Comment:    None Detected  Conversion factor:  100 mg/dl = .100 g/dl  For Medical Purposes Only      Acetaminophen Level 12/14/2020 <5* 10 - 30 ug/mL Final    Comment: Therapeutic Range: 10.0-30.0 ug/mL  Toxic: >=810 ug/mL      Salicylate, Serum 77/90/7005 0.4* 15.0 - 30.0 mg/dL Final    Comment: Therapeutic Range: 15.0-30.0 mg/dL  Toxic: >30.0 mg/dL      SARS-CoV-2, NAAT 12/14/2020 Not Detected  Not Detected Final    Comment: Rapid NAAT:   Negative results should be treated as presumptive and,  if inconsistent with clinical signs and symptoms or necessary for  patient management, should be tested with an alternative molecular  assay. Negative results do not preclude SARS-CoV-2 infection and  should not be used as the sole basis for patient management decisions. This test has been authorized by the FDA under an Emergency Use  Authorization (EUA) for use by authorized laboratories. Fact sheet for Healthcare Providers:  BuildHer.es  Fact sheet for Patients: BuildHer.es    METHODOLOGY: Isothermal Nucleic Acid Amplification      Rejected Test 12/14/2020 cbcwd   Final    Reason for Rejection 12/14/2020 see below   Final    Comment: Unable to perform testing; specimen quantity not sufficient. To perform testing the specimen will need to be recollected.   QNS      WBC 12/14/2020 11.5* 4.0 - 11.0 K/uL Final    RBC 12/14/2020 5.38  4.20 - 5.90 M/uL Final    Hemoglobin 12/14/2020 16.4  13.5 - 17.5 g/dL Final    Hematocrit 12/14/2020 46.8  40.5 - 52.5 % Final    MCV 12/14/2020 87.0  80.0 - 100.0 fL Final    MCH 12/14/2020 30.5  26.0 - 34.0 pg Final    MCHC 12/14/2020 35.0  31.0 - 36.0 g/dL Final    RDW 12/14/2020 12.9  12.4 - 15.4 % Final    Platelets 33/60/8173 192  135 - 450 K/uL Final    MPV 12/14/2020 8.4  5.0 - 10.5 fL Final    Neutrophils % 12/14/2020 79.5  % Final    Lymphocytes % 12/14/2020 10.5  % Final    Monocytes % 12/14/2020 6.6  % Final    Eosinophils % 12/14/2020 2.9  % Final    Basophils % 12/14/2020 0.5  % Final    Neutrophils Absolute 12/14/2020 9.1* 1.7 - 7.7 K/uL Final    Lymphocytes Absolute 12/14/2020 1.2  1.0 - 5.1 K/uL Final    Monocytes Absolute 12/14/2020 0.8  0.0 - 1.3 K/uL Final    Eosinophils Absolute 12/14/2020 0.3  0.0 - 0.6 K/uL Final    Basophils Absolute 12/14/2020 0.1  0.0 - 0.2 K/uL Final    Ventricular Rate 12/14/2020 85  BPM Preliminary    Atrial Rate 12/14/2020 85  BPM Preliminary    P-R Interval 12/14/2020 140  ms Preliminary    QRS Duration 12/14/2020 84  ms Preliminary    Q-T Interval 12/14/2020 386  ms Preliminary    QTc Calculation (Bazett) 12/14/2020 459  ms Preliminary    P Axis 12/14/2020 48  degrees Preliminary  R Axis 12/14/2020 43  degrees Preliminary    T Axis 12/14/2020 59  degrees Preliminary    Diagnosis 12/14/2020 Normal sinus rhythmNormal ECG   Preliminary         Formulation: Pt has hx of scz and has poor compliance with med regimen and experiencing psychotic decompensation. Dx: axis I: Schizophrenia paranoid  Axis 2: deferred   Nilam 3: See Medical History  Axis 4: Other psychosocial and environmental problems      Tx plan:    prevent self injury, stabilize affect, restore sleep, treat psychosis, establish/maintain aftercare. All conditions present on admission are being treated while pt is hospitalized.      Medications  Current Facility-Administered Medications   Medication Dose Route Frequency Provider Last Rate Last Admin    risperiDONE (RISPERDAL) tablet 2 mg  2 mg Oral BID Lesley Lynch MD        OLANZapine zydis THE PAVILIION) disintegrating tablet 10 mg  10 mg Oral Q6H PRN Lesley Lynch MD        LORazepam (ATIVAN) tablet 2 mg  2 mg Oral Q6H PRN Lesley Lynch MD        hydrOXYzine (VISTARIL) capsule 50 mg  50 mg Oral Q6H PRN Lesley Lynch MD        acetaminophen (TYLENOL) tablet 650 mg  650 mg Oral Q4H PRN Lesley Lynch MD        ibuprofen (ADVIL;MOTRIN) tablet 400 mg  400 mg Oral Q6H PRN Lesley Lynch MD        magnesium hydroxide (MILK OF MAGNESIA) 400 MG/5ML suspension 30 mL  30 mL Oral Daily PRN Lesley Lynch MD        traZODone (DESYREL) tablet 50 mg  50 mg Oral Nightly PRN Lesley Lynch MD        nicotine (NICODERM CQ) 21 MG/24HR 1 patch  1 patch Transdermal Daily Lesley Lynch MD   1 patch at 12/15/20 1177    nicotine polacrilex (COMMIT) lozenge 2 mg  2 mg Oral Q2H PRN Lesley Lynch MD          risperiDONE  2 mg Oral BID    nicotine  1 patch Transdermal Daily PRN Meds: OLANZapine zydis, LORazepam, hydrOXYzine, acetaminophen, ibuprofen, magnesium hydroxide, traZODone, nicotine polacrilex   Estimated length of stay: 3-5 days  Prognosis:  guarded   Criteria for Discharge:    Not suicidal, sleeping well, affect stable, depression improving, eating well, aftercare arranged.        Spent at least 70 minutes with evaluation process and more than 50% of the time was spent obtaining history and planning treatment with the patient

## 2020-12-15 NOTE — H&P
Hospital Medicine History & Physical      PCP: No primary care provider on file. Date of Admission: 12/14/2020    Date of Service: Pt seen/examined on 12/14/2020    Chief Complaint:  No chief complaint on file. History Of Present Illness: The patient is a 41 y.o. male with a PMH of Depression who presented to [facility] for hallucinations. Patient was seen and evaluated in the ED by the ED medical provider, patient was medically cleared for admission to [facility] at [facility]. This note serves as an admission medical H&P.   PCP: None  Tobacco Use: 0.5-1 ppd  EtOH Use: occasional  Illicit Drug Use: denies    Pt denies any medical concerns at this time. Past Medical History:        Diagnosis Date    Depression     [facility]     Schizoaffective disorder, bipolar type ([facility]        Past Surgical History:        Procedure Laterality Date    KNEE SURGERY         Medications Prior to Admission:    Prior to Admission medications    Medication Sig Start Date End Date Taking? Authorizing Provider   risperiDONE (RISPERDAL) 1 MG tablet TAKE 1 TABLET BY MOUTH TWO TIMES A DAY 9/10/20  Yes Historical Provider, MD       Allergies:  Patient has no known allergies. Social History:  The patient currently lives alone. TOBACCO:   reports that he has been smoking cigarettes. He has been smoking about 0.50 packs per day. He has never used smokeless tobacco.  ETOH:   reports previous alcohol use. Family History:   Positive as follows:    No family history on file.     REVIEW OF SYSTEMS:     Constitutional: Negative for fever   HENT: Negative for sore throat   Eyes: Negative for redness   Respiratory: Negative  for dyspnea, cough   Cardiovascular: Negative for chest pain   Gastrointestinal: Negative for vomiting, diarrhea   Genitourinary: Negative for hematuria   Musculoskeletal: Negative for arthralgias   Skin: Negative for rash   Neurological: Negative for syncope   Hematological: Negative for adenopathy Psychiatric/Behavorial: Negative for anxiety    PHYSICAL EXAM:    /76   Pulse 100   Temp 97.8 °F (36.6 °C) (Oral)   Resp 18   Ht 6' 1\" (1.854 m)   Wt 225 lb (102.1 kg)   SpO2 97%   BMI 29.69 kg/m²   Gen: No distress. Alert. Middle aged  male  Eyes: PERRL. No sclera icterus. No conjunctival injection. ENT: No discharge. Pharynx clear. Neck:  Trachea midline. Resp: No accessory muscle use. No crackles. No wheezes. No rhonchi. CV: Regular rate. Regular rhythm. No murmur. No rub. No edema. GI: Soft, Non-tender. Non-distended. Normal bowel sounds. Skin: Warm and dry. No rash on exposed extremities. Neuro: Awake.  Grossly nonfocal, CN II-XII intact    Psych: Defer to psychiatry    CBC:   Recent Labs     12/14/20  0838   WBC 11.5*   HGB 16.4   HCT 46.8   MCV 87.0        BMP:   Recent Labs     12/14/20  0749   *   K 3.7   CL 98*   CO2 23   BUN 12   CREATININE 0.8*     LIVER PROFILE:   Recent Labs     12/14/20  0749   AST 24   ALT 23   BILITOT 1.1*   ALKPHOS 73     U/A:    Lab Results   Component Value Date    COLORU YELLOW 05/24/2020    CLARITYU Clear 05/24/2020    SPECGRAV 1.025 05/24/2020    LEUKOCYTESUR Negative 05/24/2020    BLOODU Negative 05/24/2020    GLUCOSEU Negative 05/24/2020    GLUCOSEU NEGATIVE 05/02/2011       EKG:  I have reviewed the EKG with the following interpretation:   12/14/2020  Normal sinus rhythm rate of 85  Normal ECG     RADIOLOGY  None    Pertinent previous results reviewed     Rapid Covid Test: negative    ASSESSMENT/PLAN:    Acute Psychosis  Hallucinations  - cont mgmt per [facility]    Leukocytosis  - 11.5  - etiology: likely reactive  - denies: cough, fevers, chills, N/V/D, dysuria, urgency, frequency or wounds concerning for infection    Tobacco Dependence  - Recommended cessation  - nicotine patch ordered Pt has no medical complaints at this time. Pt was informed that they may have [facility] contact us should any medical concerns arise during this admission.     Jackie Saleh PA-C 9:01 AM 12/15/2020

## 2020-12-15 NOTE — FLOWSHEET NOTE
12/15/20 1008   Activities of Daily Living   Patient Requires assistance with daily self-care activities? No   Leisure Activity 1   3 Favorite Leisure Activities \"Camping\"   Frequency several times a year   Last time in the last 3 months   Barriers to participating    (Seasonal)   Leisure Activity 2   [address]  \"Boating\"   Frequency  several times a year   Last time  in the last 3 months   Barriers to participating    (Seasonal)   Leisure Activity 3   [address]  \"Fishing\"   Frequency  several times a year   Last time  in the last 3 months   Barriers to participating    (Seasonal)   Social   Patient reports spending the majority of their free time alone  (A mixture between all three)   Patient verbalizes a preference for spending free time with a group   Patients perception of support system more healthy   Patients perception of barriers to socializing with others include(s) no perceived barriers   Social Details Support System: \"My mom\"   Beliefs & Coping   Has difficulty dealing with feelings   No   Internalizes feelings/Keeps feelings in No   Externalizes feelings through aggressiveness or poor temper control  No   Feels uncomfortable around others  No   Has difficulty talking to others  No   Depends on others for direction or decisions No   Difficulty dealing with anger of others  No   Difficulty dealing with own anger  No   Difficulty managing stress No   Frequently has difficulty with relationships  No   Has recently perceived/experienced loss, disappointment, humiliation or failure  NO   General perception about self likes self   Attitude about abilities generally perceives abilities as: always successful   Locus of Control  always   Belief about recovery Recovery is possible   Patient Identified Strengths  \"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 \" Perception of most stressful event prior to hospitalization \"I was hearing voices and talking to myself. \"   Perception of changes needed \"Just time\"   Strengths and Limitations   Strengths Positive support network; Independent in basic self-care activities   Limitations Difficulty problem solving/relies on others to help solve problems;Multiple barriers to leisure interests; Difficult relationships / poor social skills     CTRS completed pt's AT/OT Leisure Assessment.     Jordan Snyder, CTRS

## 2020-12-15 NOTE — PROGRESS NOTES
Occupational Therapy      Received OT orders. Hold OT eval for Psychiatrist note.     Jamaica Fiore, OTR/L  #[id number]

## 2020-12-15 NOTE — BH NOTE
Medication verified with Ankita Delgado. Pharmacy stated Risperdal hasn't been filled since September.

## 2020-12-15 NOTE — BH NOTE
Collateral from Mother Daryle Capri): I believe he has been diagnosised with schizophrenia. He has voices that talk to him. You can tell when the voices are talking to him because he doesn't answer and he seems far away. He asked the lady at the store to call the police. He admitted to the police that he believes he has a chip in his head, and that he was being listened to by the [facility]/[place]. I'm unsure if he has been taking his psych medication regularly or not. I would like him to start getting his medication in a shot. He stays with me only when I have his kids with me. His children's mother will let me come get the kids, and he will see them when I'm around. Past trama include: his best friend overdosed, mother of his children left him for another man, and he quit his job of 15 years. He overdosed once on heroin after the mother of his children left him. We don't think he does drugs anymore. He has been hearing voices for awhile. He had been normal for three weeks, and for the last week he hadn't been acting normal. He seemed like he was in a deep depression, and he seemed paranoid. He avoids me when he gets like this, because I would tell him to get help. He never admits to doctors when he is hearing voices.

## 2020-12-15 NOTE — BH NOTE
..   `[facility]  Admission Note     Admission Type:        Reason for admission:  Reason for Admission: Psychosis. Patient believes  he has a chips placed in his head by Concha Espinal, and is controlled by him.     PATIENT STRENGTHS:  Strengths: Employment, Connection to output provider, No significant Physical Illness, Motivated, Positive Support    Patient Strengths and Limitations:       Addictive Behavior:   Addictive Behavior  In the past 3 months, have you felt or has someone told you that you have a problem with:  : None  Do you have a history of Chemical Use?: No  Do you have a history of Alcohol Use?: No  Do you have a history of Street Drug Abuse?: No  Histroy of Prescripton Drug Abuse?: No    Medical Problems:   Past Medical History:   Diagnosis Date    Depression     [facility]     Schizoaffective disorder, bipolar type ([facility]        Status EXAM:  Status and Exam  Normal: No  Facial Expression: Expressionless  Affect: Constricted  Level of Consciousness: Alert  Mood:Normal: No  Mood: Labile  Motor Activity:Normal: Yes  Interview Behavior: Cooperative, Evasive  Preception: [place] to Person, Sherryn Edward to Time, [place] to Place, [place] to Situation  Attention:Normal: No  Attention: Distractible  Thought Processes: Circumstantial  Thought Content:Normal: No  Thought Content: Delusions  Hallucinations: None(Patient denies)  Delusions: Yes  Delusions: Persecution  Memory:Normal: No  Memory: Poor Remote  Insight and Judgment: No  Insight and Judgment: Poor Judgment, Poor Insight  Present Suicidal Ideation: No  Present Homicidal Ideation: No    Tobacco Screening:  Practical Counseling, on admission, neris X, if applicable and completed (first 3 are required if patient doesn't refuse):            (X)  Recognizing danger situations (included triggers and roadblocks) (X)  Coping skills (new ways to manage stress, exercise, relaxation techniques, changing routine, distraction)                                                           (X  Basic information about quitting (benefits of quitting, techniques in how to quit, available resources  ( ) Referral for counseling faxed to [facility]                                           ( ) Patient refused counseling  ( ) Patient has not smoked in the last 30 days    Metabolic Screening:    No results found for: LABA1C    No results found for: CHOL  No results found for: TRIG  No results found for: HDL  No components found for: LDLCAL  No results found for: LABVLDL      Body mass index is 29.69 kg/m². BP Readings from Last 2 Encounters:   12/14/20 137/76   12/14/20 131/79           Pt admitted with followings belongings:  Dentures: None  Vision - Corrective Lenses: None  Hearing Aid: None  Jewelry: None  Body Piercings Removed: N/A  Clothing: Footwear, Jacket / coat, Pants, Shirt, Socks(1 pr of blue jeans, gray sweat shirt jacket with strings, , gym shoes,)  Were All Patient Medications Collected?: Not Applicable  Other Valuables: Cell phone, Money (Comment), Wallet(lighter, wallet and contents, cell phone, $1.25 dollar bill and 1 quarter)     Valuables in patient's locker. Valuables placed in safe in medication room. . Patient's home medications were N/A. Patient oriented to surroundings and program expectations and copy of patient rights given Yes. Received admission packet: Yes. Consents reviewed and signed Yes. Patient verbalize understanding: Yes. Patient education on precautions: Yes.                    Jacki oRdriguez RN

## 2020-12-15 NOTE — FLOWSHEET NOTE
12/15/20 0857   Psychiatric History   Psychiatric history treatment Psychiatric admissions  [facility] 3 weeks ago for AVH, delusions)   Are there any medication issues? Yes   Support System   Support system Access to others   Types of Support System Mother   Problems in support system Paranoia; Lack of friends/family   Current Living Situation   Home Living Adequate   Living information Lives alone   Problems with living situation  No   Lack of basic needs No   SSDI/SSI none   Other government assistance none   Problems with environment none   Current abuse issues none   Supervised setting None   Relationship problems No   Medical and Self-Care Issues   Relevant medical problems none reported   Relevant self-care issues none   Barriers to treatment No   Family Constellation   Spouse/partner-name/age no   Children-names/ages [employer]anda Products, Paylie 6   Parents Rendall Wilmer   Siblings Gustavo Maui   Contact information none given   Comment none   Childhood   Raised by Biological mother   Relevant family history Reports a happy childhood with parents and brother. No abuse.    History of abuse No   Legal History   Legal history Yes   Current charges No   Pick-up order  No   Restraining order No   Sentence pending No   Domestic violence charges No   Homicidal threats or behaviors No   Duty to warn No   Probation/parole No   Other relevant legal issues Drug overdose charge 2016; Distribution of controlled substance age 19   Comment I was just experimenting   Juvenile legal history No    Abuse Assessment   Physical Abuse Denies   Verbal Abuse Denies   Emotional abuse Denies   Financial Abuse Denies   Sexual abuse Denies   Elder abuse No   Substance Use   Use of substances  No   Education   Education HS graduate -GED   Work History   Currently employed Yes  (Works at his [employer])   /VA involvement none   Leisure/Activity   Past interests friends, camping, boating Present interests camping, boating, working   Current daily activity work and come home   Social with friends/family No   Cultural and Spiritual   Spiritual concerns No   Cultural concerns No   Completed patient psychosocial assessment. Patient reports that he called [facility] and reported that he was hearing voices and being followed by the [facility]. Patient was taken to [facility] and subsequently transferred to [facility]. Endorses AV and delusions. Having a hard time coping with both. Oriented x4. No s/h ideations reported.

## 2020-12-15 NOTE — PLAN OF CARE
Problem: Altered Mood, Psychotic Behavior:  Goal: Able to demonstrate trust by eating, participating in treatment and following staff's direction  Description: Able to demonstrate trust by eating, participating in treatment and following staff's direction  Outcome: Ongoing  Goal: Able to verbalize decrease in frequency and intensity of hallucinations  Description: Able to verbalize decrease in frequency and intensity of hallucinations  Outcome: Ongoing  Goal: Able to verbalize reality based thinking  Description: Able to verbalize reality based thinking  Outcome: Ongoing  Goal: Absence of self-harm  Description: Absence of self-harm  Outcome: Ongoing  Goal: Ability to achieve adequate nutritional intake will improve  Description: Ability to achieve adequate nutritional intake will improve  Outcome: Ongoing  Goal: Ability to interact with others will improve  Description: Ability to interact with others will improve  Outcome: Ongoing    Patient is interactive with staff. Patient isolative to room except for meals. Patient denies  SI/HI/AVH. Patient states they slept good last night. Patient giving minimal information this morning during interview. He reports feeling \"okay\". Patient states, \"I came into the hospital because I was talking to myself. \" Patient compliant with medications. Patient is calm and cooperative.

## 2020-12-16 LAB
CHOLESTEROL, TOTAL: 209 MG/DL (ref 0–199)
ESTIMATED AVERAGE GLUCOSE: 93.9 MG/DL
HBA1C MFR BLD: 4.9 %
HDLC SERPL-MCNC: 44 MG/DL (ref 40–60)
LDL CHOLESTEROL CALCULATED: 136 MG/DL
TRIGL SERPL-MCNC: 146 MG/DL (ref 0–150)
TSH SERPL DL<=0.05 MIU/L-ACNC: 2.34 UIU/ML (ref 0.27–4.2)
VLDLC SERPL CALC-MCNC: 29 MG/DL

## 2020-12-16 PROCEDURE — 6370000000 HC RX 637 (ALT 250 FOR IP): Performed by: PSYCHIATRY & NEUROLOGY

## 2020-12-16 PROCEDURE — 99233 SBSQ HOSP IP/OBS HIGH 50: CPT | Performed by: PSYCHIATRY & NEUROLOGY

## 2020-12-16 PROCEDURE — 97165 OT EVAL LOW COMPLEX 30 MIN: CPT

## 2020-12-16 PROCEDURE — 97535 SELF CARE MNGMENT TRAINING: CPT

## 2020-12-16 PROCEDURE — 1240000000 HC EMOTIONAL WELLNESS R&B

## 2020-12-16 RX ORDER — HYDROXYZINE PAMOATE 25 MG/1
25 CAPSULE ORAL EVERY 6 HOURS PRN
Status: DISCONTINUED | OUTPATIENT
Start: 2020-12-16 | End: 2020-12-16

## 2020-12-16 RX ORDER — RISPERIDONE 2 MG/1
2 TABLET, FILM COATED ORAL NIGHTLY
Status: DISCONTINUED | OUTPATIENT
Start: 2020-12-17 | End: 2020-12-17 | Stop reason: HOSPADM

## 2020-12-16 RX ORDER — OLANZAPINE 5 MG/1
5 TABLET, ORALLY DISINTEGRATING ORAL EVERY 6 HOURS PRN
Status: DISCONTINUED | OUTPATIENT
Start: 2020-12-16 | End: 2020-12-17 | Stop reason: HOSPADM

## 2020-12-16 RX ADMIN — OLANZAPINE 5 MG: 5 TABLET, ORALLY DISINTEGRATING ORAL at 14:58

## 2020-12-16 RX ADMIN — RISPERIDONE 2 MG: 2 TABLET ORAL at 08:40

## 2020-12-16 RX ADMIN — Medication: at 14:34

## 2020-12-16 NOTE — PROGRESS NOTES
Inpatient Occupational Therapy  Evaluation and Treatment    Unit:  [facility]  Date:  12/16/2020  Patient Name:    Roc Freeman  Admitting diagnosis:  Psychosis, unspecified psychosis type [facility]) Isabel Govea Date:  12/14/2020  Precautions/Restrictions/WB Status/ Lines/ Wounds/ Oxygen:  Up as tolerated  Treatment Time:  10:21-11:07  Treatment Number:  1    Patient Goals for Therapy:  \" Get my medicine right. \"      Discharge Recommendations:   []Home Independently  [x] Home with assist [] Home OT  []SNF  []ARU    DME needs for discharge:   NA     AM-PAC Score:  24     Home Health S4 Level: [x] NA   [] Level 1- Standard  []  Level 2- Social  [] Level 3- Safety  []  Level 4- Sick    ACLS:    TBA      HPI:  Jaron Estevez MD, Date of Service:  12/15/2020   40 y/o wm with hx scz that presented to ed secondary to psychotic decompensation. Pt verbalized that he has been hearing voices and admitted to having a chip in hi head rgar allows him to have direct communication with president Meredith Tomas. Pt stated that he has not been taking the risperdal consistently and admits that he starts talking to himself and hears voices when he starts missing doses. Pt stated that he feels meds are not working for him anymore and feels he needs a higher dose. Pt is somewhat guarded regarding his sx's and describes it as extreme anxiety because he is trying to runaway from his head. Pt stated that he cont to work and enjoys works and denies any neurovegetative sx's of depression and denies si/hi.     Dx: axis I: Schizophrenia paranoid  Axis 2: deferred   Nilam 3: See Medical History  Axis 4: Other psychosocial and environmental problems      Preadmission Environment:    Pt. Lives   [x] alone  []with family  Home environment:   []Apartment   []one story  [x] Bi-level  Steps to enter first floor:    [x]  6 Steps to enter   [] Railings    Steps to 1st and second floor  [x] 6 steps  Bathroom: [x] Bath Tub Shower  []Walk in [facility]  [] Grab bars Equipment owned: [] RW [] SW  []Rollator []W/C  []Shower Chair []BSC []Reacher  []Cane [] Other     Preadmission Status / PLOF:  History of falls   []Yes  [x]No  Pt. Able to drive   [x]Yes  []No    Pt Fully independent for ADLs/IADLs. [x]Yes  []No    Pt. Required assistance from family for:  []Bathing []Dressing []Cooking []Cleaning  []Laundry  []Other :   Pt. Fully independent for transfers and gait and walked with: [x]No Device  []Walker   []Cane  Sleep Hygiene:   6-8 hours sleep avg; fragmented  Income:  >30 hours wk; works at [employer]. Financial Management:  Self; Pt. Reports no difficulty. Leisure Interests:  Camping, boating, fishing, music, movies  Medication Management:  Self; Pt. Reports difficulty taking meds consistently. Health Management:   Pt. Reports that he dose not have a PCP however has a Psychiatrist and therapist.  Social Network: Mother, brother  Stressors:  1. \"I didn't like the idea of taking any medications. \"  2. \"Not being able to spend a lot of quality time with my kids. \" (Being couped up because of COVID.)    Coping Skills:  Be out an about at the [place], shopping/window shopping    Pain  []Yes  [x]No  Rating:  Location:  Pain Medicine Status: [] Denies need  [] Pain med requested  [] RN notified. Cognition    A&Ox4,   Patient pleasant and cooperative. Follows []1 step and [x] 3 step commands. Upper Extremity ROM:    WFL, pt able to perform all bed mobility, transfers, and gait without ROM limitation. Upper Extremity Strength:    WFL, pt able to perform all bed mobility, transfers, and gait without strength limitation. Upper Extremity Sensation:    No apparent deficits. Upper Extremity Proprioception:    No apparent deficits.     Skin Integrity:  WFL     Coordination and Tone:  WFL    Balance  Static Sitting:  [x] Good [] Fair [] Poor  Dynamic Sitting:  [x] Good [] Fair [] Poor  Static Standing: [x] Good [] Fair [] Poor Dynamic Standing: [x] Good [] Fair [] Poor    Bed mobility:   independent  Supine to sit:  Sit to supine:  Scooting to head of bed:  Scooting in sitting:  Rolling:  Bridging:    Transfers:   Independent  Sit to stand:  Stand to sit:  Bed/Chair to/from toilet:    Self Care:   independent  Toileting:  Grooming:  Dressing:    Exercise / Activities Initiated:   Pt. Educated on role of OT. Pt. Participated in:  Eval, ADL Retraining, healthy habits/routines, health management, and medication management. Assessment of Deficits:   Pt demonstrated decreased activity tolerance, decreased safety awareness, decreased cognition, and decreased ADL/IADL status. Pt. Limited during evaluation by decreased cognition. At end of evaluation, pt. In room. Goal(s) : To be met in 3 Visits:  1). Pt. To complete ACLS. 2). Pt. To verbalize understanding of sleep hygiene education. To be met in 5 Visits:  1). Pt. To complete 1 SMART long term goal and 2 SMART short term goals with mod assist.  2). Pt. To verbalize 3 new coping skills. 3). Pt. To complete interest check list.    4). Pt. To verbalize understanding of 3 communication styles. 5). Pt. To complete wellness plan. 6). Pt. To complete a daily schedule of healthy activities/routines with mod assist.   7). Pt. To identify 2 memory strategies to take medications as prescribed. Rehabilitation Potential:  Good for goals listed above. Strengths for achieving goals include:  PLOF  Barriers to achieving goals include:  Decreased Cognition     Plan: To be seen 2-5x/week while in acute care setting for therapeutic exercises/act, ADL retraining, NMR and patient/caregiver ed/instruction.      Timed Code Treatment Minutes:   36  minutes    Total Treatment Time:    46  minutes    Signature and License Number    NAN [place]/L  #[id number]        If patient discharges from this facility prior to next visit, this note will serve as the Discharge Summary

## 2020-12-16 NOTE — PLAN OF CARE
Problem: Altered Mood, Psychotic Behavior:  Goal: Ability to interact with others will improve  Description: Ability to interact with others will improve  12/16/2020 0016 by Steven Johnson RN  Outcome: Ongoing   Joy Carson spent most of the evening in his room. He did not attend any of the evening groups. Joy Carson did not interact with peers this evening. He did have a phone call from his mother that seemed to go well. During 1:1, Joy Carson presented as evasive, he was tired and did not want to talk,  minimal answers were received for the questions asked. Joy Carson denied hallucinations. He also denied suicidal and homicidal thoughts. When asked how his day went, Joy Carson said, [place]. He stated that he attended some of the day shift groups. Joy Carson was compliant with his hs medications. When asked about Snadee Hall stated that he takes that medication at home and that he knows about it.

## 2020-12-16 NOTE — BH NOTE
[facility]  Treatment Team Note  Review Date & Time: 12/15/2020  0900    Patient was not in treatment team    Status EXAM:   Status and Exam  Normal: No  Facial Expression: Flat, Expressionless  Affect: Blunt, Unstable  Level of Consciousness: Alert  Mood:Normal: No  Mood: Anxious, Helpless  Motor Activity:Normal: No  Motor Activity: Increased(pacing the unit)  Interview Behavior: Cooperative, Impulsive  Preception: [place] to Person  Attention:Normal: No  Attention: Unable to Concentrate  Thought Processes: Blocking  Thought Content:Normal: No  Thought Content: Poverty of Content  Hallucinations: Unable to assess(\"Walking up and down the road. \")  Delusions: No  Delusions: Persecution  Memory:Normal: (DEBBIE)  Memory: Poor Recent, Poor Remote  Insight and Judgment: Yes  Insight and Judgment: Poor Judgment, Poor Insight  Present Suicidal Ideation: No  Present Homicidal Ideation: No      Suicide Risk CSSR-S:  1) Within the past month, have you wished you were dead or wished you could go to sleep and not wake up? : No  2) Have you actually had any thoughts of killing yourself? : No  6) Have you ever done anything, started to do anything, or prepared to do anything to end your life?: No      PLAN/TREATMENT RECOMMENDATIONS UPDATE: Patient will take medication as prescribed, eat 75% of meals, attend groups, participate in milieu activities, participate in treatment team and care planning for discharge and follow up      Jonathan Gipson RN

## 2020-12-16 NOTE — PROGRESS NOTES
Department of Psychiatry  Attending Progress Note  Chief Complaint: follow-up psychosis and anxiety  Patient's chart was reviewed and collaborated with  about the treatment plan. SUBJECTIVE:    Patient is feeling better. Suicidal ideation:  denies suicidal ideation. Patient has medication side effects. Pt stated that he had nightmares last night and he is feeling groggy. Pt stated that he feels he hears the voices when he gets anxious and this is the reason why he came into the hospital. Pt stated that he does not take meds as prescribed and he thinks they might be too strong. We discussed d/c vistaril and switching frequency of risperdal to hs only. Awaiting on op psychaitrist to provide invega sustenna given the hx of poor compliance. ROS: Patient has new complaints: no  Sleeping adequately:  Yes   Appetite adequate: Yes  Attending groups: No: isolative to room  Visitors:No    OBJECTIVE    Physical  VITALS:  /66   Pulse 65   Temp 97.8 °F (36.6 °C) (Temporal)   Resp 16   Ht 6' 1\" (1.854 m)   Wt 225 lb (102.1 kg)   SpO2 97%   BMI 29.69 kg/m²     Mental Status Examination:  Patients appearance was well-appearing, street clothes, seated in bed, good grooming and good hygiene. Thoughts are [place] and Logical. Homicidal ideations none. No abnormal movements, tics or mannerisms. Memory intact A[place] 0. Concentration Fair. Alert and oriented X 4. Insight and Judgement limited. Patient was cooperative. Patient gait normal. Mood tired, affect mood congruent Hallucinations denies at this time but appears distracted, suicidal ideations no specific plan to harm self Speech fluent and spontaneous    Data  Labs:   No visits with results within 2 Day(s) from this visit.    Latest known visit with results is:   Admission on 12/14/2020, Discharged on 12/14/2020   Component Date Value Ref Range Status    Sodium 12/14/2020 134* 136 - 145 mmol/L Final assay. Negative results do not preclude SARS-CoV-2 infection and  should not be used as the sole basis for patient management decisions. This test has been authorized by the FDA under an Emergency Use  Authorization (EUA) for use by authorized laboratories. Fact sheet for Healthcare Providers:  Jose.es  Fact sheet for Patients: Jose.es    METHODOLOGY: Isothermal Nucleic Acid Amplification      Rejected Test 12/14/2020 cbcwd   Final    Reason for Rejection 12/14/2020 see below   Final    Comment: Unable to perform testing; specimen quantity not sufficient. To perform testing the specimen will need to be recollected.   QNS      WBC 12/14/2020 11.5* 4.0 - 11.0 K/uL Final    RBC 12/14/2020 5.38  4.20 - 5.90 M/uL Final    Hemoglobin 12/14/2020 16.4  13.5 - 17.5 g/dL Final    Hematocrit 12/14/2020 46.8  40.5 - 52.5 % Final    MCV 12/14/2020 87.0  80.0 - 100.0 fL Final    MCH 12/14/2020 30.5  26.0 - 34.0 pg Final    MCHC 12/14/2020 35.0  31.0 - 36.0 g/dL Final    RDW 12/14/2020 12.9  12.4 - 15.4 % Final    Platelets 33/70/3828 192  135 - 450 K/uL Final    MPV 12/14/2020 8.4  5.0 - 10.5 fL Final    Neutrophils % 12/14/2020 79.5  % Final    Lymphocytes % 12/14/2020 10.5  % Final    Monocytes % 12/14/2020 6.6  % Final    Eosinophils % 12/14/2020 2.9  % Final    Basophils % 12/14/2020 0.5  % Final    Neutrophils Absolute 12/14/2020 9.1* 1.7 - 7.7 K/uL Final    Lymphocytes Absolute 12/14/2020 1.2  1.0 - 5.1 K/uL Final    Monocytes Absolute 12/14/2020 0.8  0.0 - 1.3 K/uL Final    Eosinophils Absolute 12/14/2020 0.3  0.0 - 0.6 K/uL Final    Basophils Absolute 12/14/2020 0.1  0.0 - 0.2 K/uL Final    Ventricular Rate 12/14/2020 85  BPM Final    Atrial Rate 12/14/2020 85  BPM Final    P-R Interval 12/14/2020 140  ms Final    QRS Duration 12/14/2020 84  ms Final    Q-T Interval 12/14/2020 386  ms Final  QTc Calculation (Bazett) 12/14/2020 459  ms Final    P Axis 12/14/2020 48  degrees Final    R Axis 12/14/2020 43  degrees Final    T Axis 12/14/2020 59  degrees Final    Diagnosis 12/14/2020 Normal sinus rhythmNormal ECGConfirmed by Uriah Garrido ([id number]) on 12/15/2020 5:42:34 PM   Final    Cholesterol, Total 12/15/2020 209* 0 - 199 mg/dL Final    Triglycerides 12/15/2020 146  0 - 150 mg/dL Final    HDL 12/15/2020 44  40 - 60 mg/dL Final    LDL Calculated 12/15/2020 136* <100 mg/dL Final    VLDL Cholesterol Calculated 12/15/2020 29  Not Established mg/dL Final    TSH 12/15/2020 2.34  0.27 - 4.20 uIU/mL Final    Hemoglobin A1C 12/15/2020 4.9  See comment % Final    Comment: Comment:  Diagnosis of Diabetes: > or = 6.5%  Increased risk of diabetes (Prediabetes): 5.7-6.4%  Glycemic Control: Nonpregnant Adults: <7.0%                    Pregnant: <6.0%        eAG 12/15/2020 93.9  mg/dL Final            Medications  Current Facility-Administered Medications: [START ON 12/17/2020] risperiDONE (RISPERDAL) tablet 2 mg, 2 mg, Oral, Nightly  OLANZapine zydis (ZYPREXA) disintegrating tablet 5 mg, 5 mg, Oral, Q6H PRN  medicated lip balm (BLISTEX/CARMEX) stick, , Topical, PRN  acetaminophen (TYLENOL) tablet 650 mg, 650 mg, Oral, Q4H PRN  ibuprofen (ADVIL;MOTRIN) tablet 400 mg, 400 mg, Oral, Q6H PRN  magnesium hydroxide (MILK OF MAGNESIA) 400 MG/5ML suspension 30 mL, 30 mL, Oral, Daily PRN  traZODone (DESYREL) tablet 50 mg, 50 mg, Oral, Nightly PRN  nicotine (NICODERM CQ) 21 MG/24HR 1 patch, 1 patch, Transdermal, Daily  nicotine polacrilex (COMMIT) lozenge 2 mg, 2 mg, Oral, Q2H PRN    ASSESSMENT AND PLAN    axis I: Schizophrenia paranoid  Axis 2: deferred   Nilam 3: See Medical History  Axis 4: Other psychosocial and environmental problems         1. Patient s symptoms   are improving  2. Probable discharge is 2-3 days  3. Discharge planning is incomplete  4 Suicidal ideation is better

## 2020-12-16 NOTE — PLAN OF CARE
Problem: Altered Mood, Psychotic Behavior:  Goal: Able to demonstrate trust by eating, participating in treatment and following staff's direction  Description: Able to demonstrate trust by eating, participating in treatment and following staff's direction  Outcome: Ongoing  Goal: Able to verbalize decrease in frequency and intensity of hallucinations  Description: Able to verbalize decrease in frequency and intensity of hallucinations  Outcome: Ongoing  Goal: Able to verbalize reality based thinking  Description: Able to verbalize reality based thinking  Outcome: Ongoing  Goal: Absence of self-harm  Description: Absence of self-harm  Outcome: Ongoing  Goal: Ability to achieve adequate nutritional intake will improve  Description: Ability to achieve adequate nutritional intake will improve  Outcome: Ongoing  Goal: Ability to interact with others will improve  Description: Ability to interact with others will improve  12/16/2020 1129 by Gina Barlow RN  Outcome: Ongoing    Patient is minimally interactive with staff. Patient withdrawn to room except for meals. Patient denies SI/HI/AVH. Patient states he slept \"okay\" last night. Patient states, \"I believe the anxiety medication last night caused nightmares. \" He stated, \"The anxiety was different last night after I took the anxiety medication; it didn't bother me. \" He reports feeling \"good\". Patient compliant with medications. Patient is calm and cooperative.

## 2020-12-17 VITALS
HEIGHT: 73 IN | DIASTOLIC BLOOD PRESSURE: 90 MMHG | OXYGEN SATURATION: 98 % | HEART RATE: 83 BPM | BODY MASS INDEX: 29.82 KG/M2 | SYSTOLIC BLOOD PRESSURE: 139 MMHG | WEIGHT: 225 LBS | RESPIRATION RATE: 16 BRPM | TEMPERATURE: 98 F

## 2020-12-17 PROCEDURE — 5130000000 HC BRIDGE APPOINTMENT

## 2020-12-17 PROCEDURE — 6370000000 HC RX 637 (ALT 250 FOR IP): Performed by: PSYCHIATRY & NEUROLOGY

## 2020-12-17 PROCEDURE — 99239 HOSP IP/OBS DSCHRG MGMT >30: CPT | Performed by: PSYCHIATRY & NEUROLOGY

## 2020-12-17 PROCEDURE — 97535 SELF CARE MNGMENT TRAINING: CPT

## 2020-12-17 RX ORDER — NICOTINE 21 MG/24HR
1 PATCH, TRANSDERMAL 24 HOURS TRANSDERMAL DAILY
Qty: 30 PATCH | Refills: 0 | Status: SHIPPED | OUTPATIENT
Start: 2020-12-18 | End: 2021-04-25

## 2020-12-17 RX ORDER — RISPERIDONE 2 MG/1
2 TABLET, FILM COATED ORAL NIGHTLY
Qty: 5 TABLET | Refills: 0 | Status: SHIPPED | OUTPATIENT
Start: 2020-12-17 | End: 2021-04-25

## 2020-12-17 RX ADMIN — NICOTINE POLACRILEX 2 MG: 2 LOZENGE ORAL at 08:33

## 2020-12-17 RX ADMIN — NICOTINE POLACRILEX 2 MG: 2 LOZENGE ORAL at 13:03

## 2020-12-17 RX ADMIN — NICOTINE POLACRILEX 2 MG: 2 LOZENGE ORAL at 15:52

## 2020-12-17 NOTE — PROGRESS NOTES
.  [facility]  Discharge Note    Pt discharged with followings belongings:   Dentures: None  Vision - Corrective Lenses: None  Hearing Aid: None  Jewelry: None  Body Piercings Removed: N/A  Clothing: Footwear, Jacket / coat, Pants, Shirt, Socks(1 pr of blue jeans, gray sweat shirt jacket with strings, , gym shoes,)  Were All Patient Medications Collected?: Not Applicable  Other Valuables: Cell phone, Money (Comment), Wallet(lighter, wallet and contents, cell phone, $1.25 dollar bill and 1 quarter)   Valuables sent home with patient. Valuables retrieved from safe, and returned to patient. Patient education on aftercare instructions: yes  Information faxed to by charge nurse  Patient verbalize understanding of AVS:  yes. Status EXAM upon discharge:  Status and Exam  Normal: No  Facial Expression: Worried  Affect: Congruent  Level of Consciousness: Alert  Mood:Normal: No  Mood: Anxious  Motor Activity:Normal: Yes  Motor Activity: Decreased  Interview Behavior: Cooperative  Preception: [place] to Person, Rehan Borrow to Time, [place] to Place, [place] to Situation  Attention:Normal: No  Attention: Distractible  Thought Processes: Circumstantial  Thought Content:Normal: No  Thought Content: Preoccupations  Hallucinations:  Other (Comment), None  Delusions: No  Delusions: Persecution  Memory:Normal: No  Memory: Poor Recent  Insight and Judgment: No  Insight and Judgment: Poor Insight  Present Suicidal Ideation: No  Present Homicidal Ideation: No      Metabolic Screening:    Lab Results   Component Value Date    LABA1C 4.9 12/14/2020       Lab Results   Component Value Date    CHOL 209 (H) 12/14/2020     Lab Results   Component Value Date    TRIG 146 12/14/2020     Lab Results   Component Value Date    HDL 44 12/14/2020     No components found for: [facility] EVALUATION AND TREATMENT [place]  Lab Results   Component Value Date    LABVLDL 29 12/14/2020       Lucy Abdi RN

## 2020-12-17 NOTE — SUICIDE SAFETY PLAN
SAFETY PLAN    A suicide Safety Plan is a document that supports someone when they are having thoughts of suicide. Warning Signs that indicate a suicidal crisis may be developing: What (situations, thoughts, feelings, body sensations, behaviors, etc.) do you experience that lets you know you are beginning to think about suicide? 1. anxiety    Internal Coping Strategies:  What things can I do (relaxation techniques, hobbies, physical activities, etc.) to take my mind off my problems without contacting another person? 1. \"I don't have problems until my anxiety takes over. \"  2. work  3. arts    People and social settings that provide distraction: Who can I call or where can I go to distract me? 1. Name: My Mom  Phone: [phone]  2. Name: My Brother  Phone: [phone]     People whom I can ask for help: Who can I call when I need help - for example, friends, family, clergy, someone else? 1. Name: My Mom  Phone: [phone]  2. Name: My Brother  Phone: [phone]    Professionals or [address] [place] [address] I can contact during a crisis: Who can I call for help - for example, my doctor, my psychiatrist, my psychologist, a mental health provider, a suicide hotline? 1. Clinician Name: [address] [place] [address]   Address: [address] [place], [facility]      Phone  #: [phone]    2. Suicide Prevention Lifeline: 6-778-598-TALK (8808)    Making the environment safe: How can I make my environment (house/apartment/living space) safer? For example, can I remove guns, medications, and other items? 1. I never have self harm    Something that is important to me and worth living for is: family, kids, boating, camping, fishing, ect.

## 2020-12-17 NOTE — PLAN OF CARE
Problem: Altered Mood, Psychotic Behavior:  Goal: Able to demonstrate trust by eating, participating in treatment and following staff's direction  Outcome: Ongoing     Problem: Altered Mood, Psychotic Behavior:  Goal: Able to verbalize decrease in frequency and intensity of hallucinations  12/17/2020 0847 by Victorino Alvarez RN  Outcome: Ongoing     Problem: Altered Mood, Psychotic Behavior:  Goal: Able to verbalize reality based thinking  Outcome: Ongoing     Problem: Altered Mood, Psychotic Behavior:  Goal: Absence of self-harm  12/17/2020 0847 by Victorino Alvarez RN  Outcome: Ongoing     Problem: Altered Mood, Psychotic Behavior:  Goal: Ability to achieve adequate nutritional intake will improve  Outcome: Ongoing     Problem: Altered Mood, Psychotic Behavior:  Goal: Ability to interact with others will improve  Outcome: Ongoing

## 2020-12-17 NOTE — FLOWSHEET NOTE
12/17/20 0844   Status and Exam   Normal No   Facial Expression Worried   Affect Congruent   Level of Consciousness Alert   Mood:Normal No   Mood Anxious   Motor Activity:Normal Yes   Interview Behavior Cooperative   Preception [place] to Person;[place] to Time;[place] to Place;[place] to Situation   Attention:Normal No   Attention Distractible   Thought Processes Circumstantial   Thought Content:Normal No   Hallucinations Other (Comment); None   Delusions No   Memory:Normal No   Memory Poor Recent   Insight and Judgment No   Insight and Judgment Poor Insight   Present Suicidal Ideation No   Present Homicidal Ideation No

## 2020-12-17 NOTE — PLAN OF CARE
Problem: Altered Mood, Psychotic Behavior:  Goal: Able to verbalize decrease in frequency and intensity of hallucinations  Description: Able to verbalize decrease in frequency and intensity of hallucinations  12/16/2020 2137 by Germaine Dodd RN  Outcome: Ongoing     Problem: Altered Mood, Psychotic Behavior:  Goal: Absence of self-harm  Description: Absence of self-harm  12/16/2020 2137 by Germaine Dodd RN  Outcome: Ongoing   Isolative to room and self. Denies A/V/H. Says his condition has improved since admission to the unit. Denies SI. Displaying safe behaviors on unit. Patient did not attend groups. No medications were scheduled.

## 2020-12-17 NOTE — PROGRESS NOTES
Inpatient Occupational Therapy  Evaluation and Treatment    Unit:  [facility]  Date:  12/17/2020  Patient Name:    Irene Mi  Admitting diagnosis:  Psychosis, unspecified psychosis type [facility]) [place] Standard Date:  12/14/2020  Precautions/Restrictions/WB Status/ Lines/ Wounds/ Oxygen:  Up as tolerated  Treatment Time:  10:46-11:40  Treatment Number:  2    Patient Goals for Therapy:  \" Get my medicine right. \"      Discharge Recommendations:   []Home Independently  [x] Home with assist [] Home OT  []SNF  []ARU    DME needs for discharge:   NA     AM-PAC Score:  24     Home Health S4 Level: [x] NA   [] Level 1- Standard  []  Level 2- Social  [] Level 3- Safety  []  Level 4- Sick    ACLS:    TBA      HPI:  Deshaun Hemphill MD, Date of Service:  12/15/2020   40 y/o wm with hx scz that presented to ed secondary to psychotic decompensation. Pt verbalized that he has been hearing voices and admitted to having a chip in hi head rgar allows him to have direct communication with president Layne Dupont. Pt stated that he has not been taking the risperdal consistently and admits that he starts talking to himself and hears voices when he starts missing doses. Pt stated that he feels meds are not working for him anymore and feels he needs a higher dose. Pt is somewhat guarded regarding his sx's and describes it as extreme anxiety because he is trying to runaway from his head. Pt stated that he cont to work and enjoys works and denies any neurovegetative sx's of depression and denies si/hi. Dx: axis I: Schizophrenia paranoid  Axis 2: deferred   Nilam 3: See Medical History  Axis 4: Other psychosocial and environmental problems      Pain  []Yes  [x]No  Rating:  Location:  Pain Medicine Status: [] Denies need  [] Pain med requested  [] RN notified. Cognition    A&Ox4,   Patient pleasant and cooperative. Follows []1 step and [x] 3 step commands.     ADL Retraining:  Interest Checklist [facility] Adapted Version: Health and Fitness:  4 likes  Sports:  8 likes, 2 want to try  Creative:  6 likes  Productivity at home:  2 likes/ok  Leisure at home:  7 likes/ok  Social:  4 likes/ok  Outdoor Pursuits:  6 likes/ok  Out and About:  7 likes/ok    Educational:  4 likes    Assessment:   Pt participated in ADL Retraining, interest checklist, leisure exploration, and healthy habits and routines. Pt. Participated well and demo'd progress, meeting one goal today. Pt. Limited during tx by decreased cognition. At end of tx, pt. In room. Goal(s) : To be met in 3 Visits:  1). Pt. To complete ACLS. 2). Pt. To verbalize understanding of sleep hygiene education. To be met in 5 Visits:  1). Pt. To complete 1 SMART long term goal and 2 SMART short term goals with mod assist.  2). Pt. To verbalize 3 new coping skills. 3). Pt. To complete interest check list.  (Goal Met 12/17/2020)  4). Pt. To verbalize understanding of 3 communication styles. 5). Pt. To complete wellness plan. 6). Pt. To complete a daily schedule of healthy activities/routines with mod assist.   7). Pt. To identify 2 memory strategies to take medications as prescribed. Plan:  Continue OT per POC.      Timed Code Treatment Minutes:   54  minutes    Total Treatment Time:   54  minutes    Signature and License Number    Geo Farris OTR/L  #[id number]        If patient discharges from this facility prior to next visit, this note will serve as the Discharge Summary

## 2020-12-18 NOTE — DISCHARGE SUMMARY
Collateral from Mother Janay Luque): I believe he has been diagnosed with schizophrenia. He has voices that talk to him. You can tell when the voices are talking to him because he doesn't answer and he seems far away. He asked the lady at the store to call the police. He admitted to the police that he believes he has a chip in his head, and that he was being listened to by the [employer]. I'm unsure if he has been taking his psych medication regularly or not. I would like him to start getting his medication in a shot. He stays with me only when I have his kids with me. His children's mother will let me come get the kids, and he will see them when I'm around. Past trama include: his best friend overdosed, mother of his children left him for another man, and he quit his job of 15 years. He overdosed once on heroin after the mother of his children left him. We don't think he does drugs anymore. He has been hearing voices for awhile. He had been normal for three weeks, and for the last week he hadn't been acting normal. He seemed like he was in a deep depression, and he seemed paranoid. He avoids me when he gets like this, because I would tell him to get help. He never admits to doctors when he is hearing voices.    current medications    Home Medications           Prior to Admission medications    Medication Sig Start Date End Date Taking? Authorizing Provider   risperiDONE (RISPERDAL) 1 MG tablet TAKE 1 TABLET BY MOUTH TWO TIMES A DAY 9/10/20   Yes Historical Provider, MD          Past psychiatric and medical history:  Hosp:multiple hospitalizations, last admission a week ago [facility] same presentation,trials risperdal. One previous suicide attemtp via heroin od [facility] [address] [place] [address] psychiatry Dr Stephane Allan.         Substance Abuse History: denies current use. Did experimented in the past but will not specify      Social Hx: Pt lives by himself but has support form mom. Pt has 2 children 6 and 10 y/o. Pt sees them with his mom. Pt works at [employer]. Pt is hs grad. No legal issues pending. Pt reports happy childhood and denies hx of abuse.      Family Mental Health Hx:   None reported       Mental Status Examination on admission:    Level of consciousness: Moderate dysattention (reduced clarity of awareness with impaired ability to focus, sustain, or shift attention) and awake  Appearance:  well-appearing, street clothes, lying in bed, good grooming and good hygiene well-developed, well-nourished  Behavior/Motor:  no abnormalities noted normal gait and station and normal balance AIMS: 0  Attitude toward examiner:  good eye contact and guarded  Speech:  spontaneous, normal rate and normal volume Mood:  anxious Affect:  blunted  Hallucinations: Present - would not say what voices are telling him Thought processes:  slow  Attention: attention span appeared shorter than expected for age Thought content:  paranoid thought insertion and delusions of control Abstraction: concrete  OCD: none   Insight: impaired insight Judgement: impaired judgment  Cognition:  oriented to person, place, and time  Fund of Knowledge: average   IQ: average Memory: intact  Suicide:  no specific plan to harm self Sleep: no sleep issues Appetite: ok      Inventory of strengths and weaknesses:Family support and Caregiver support  ROS:  See Medical H&PE    PE on admission:  /83   Pulse 82   Temp 98.7 °F (37.1 °C) (Temporal)   Resp 16   Ht 6' 1\" (1.854 m)   Wt 225 lb (102.1 kg)   SpO2 96%   BMI 29.69 kg/m²     Cranial Nerves: 1-12 appear to be intact . Hospital Course:   1. Admitted to inpatient psychiatric for stabilization and treatment. 2. On admission, resumed risperidone 1mg BID; ordered q15min checks for safety, programming, and prn medication for anxiety, agitation, and insomnia. Commonly known as: [id number] [facility] 1 patch onto the skin daily        CHANGE how you take these medications    risperiDONE 2 MG tablet  Commonly known as: RISPERDAL  Take 1 tablet by mouth nightly for 5 days  What changed:   · medication strength  · See the new instructions. Where to Get Your Medications      You can get these medications from any pharmacy    Bring a paper prescription for each of these medications  · nicotine 21 MG/24HR  · risperiDONE 2 MG tablet         Follow-up Plan: The following was given the patient at discharge: The crisis number for [facility] is 281-CARE. This crisis line is available 24 hours a day, seven days a week. The crisis number for [facility] is [phone]. You can use this number at any time to access emergency mental health services. Please follow up with your PCP regarding any pending labs. Your next appointment is:  Name of Provider: Dr. Mishel Lopez   Provider specialty/license: psychiatry   Date and time of appointment: Tomorrow 12/18/2020 @ 11:20am   The type/s of services requested are: medication management  Agency name: [facility]  Address: [address] [place] [address]  Phone Number: [phone]  Special instructions (what to bring to appointment, etc.): N/A     **With the current concerns for Coronavirus (COVID-19), please contact your providers prior to going in to their offices. [address] and agencies have adjusted their practices to reduce spread of the illness. If you have any questions about the virus or recommendations for home care, please call the [facility]) COVID-19 hotline at [phone]. For all emergencies, please contact 321. **    More than 30 minutes were spent on day-of-discharge assessment and planning with the patient.

## 2021-03-15 ENCOUNTER — HOSPITAL ENCOUNTER (EMERGENCY)
Age: 41
Discharge: HOME | End: 2021-03-15
Attending: EMERGENCY MEDICINE
Payer: COMMERCIAL

## 2021-03-15 VITALS
BODY MASS INDEX: 29.8 KG/M2 | HEIGHT: 72 IN | DIASTOLIC BLOOD PRESSURE: 81 MMHG | TEMPERATURE: 98 F | HEART RATE: 90 BPM | SYSTOLIC BLOOD PRESSURE: 132 MMHG | WEIGHT: 220 LBS | OXYGEN SATURATION: 99 % | RESPIRATION RATE: 16 BRPM

## 2021-03-15 DIAGNOSIS — F20.9 SCHIZOPHRENIA, UNSPECIFIED TYPE (HCC): Primary | ICD-10-CM

## 2021-03-15 DIAGNOSIS — R45.1 AGITATION: ICD-10-CM

## 2021-03-15 LAB
A/G RATIO: 1.6 (ref 1.1–2.2)
ACETAMINOPHEN LEVEL: <5 UG/ML (ref 10–30)
ALBUMIN SERPL-MCNC: 4.7 G/DL (ref 3.4–5)
ALP BLD-CCNC: 69 U/L (ref 40–129)
ALT SERPL-CCNC: 36 U/L (ref 10–40)
AMPHETAMINE SCREEN, URINE: POSITIVE
ANION GAP SERPL CALCULATED.3IONS-SCNC: 12 MMOL/L (ref 3–16)
AST SERPL-CCNC: 75 U/L (ref 15–37)
BARBITURATE SCREEN URINE: ABNORMAL
BASOPHILS ABSOLUTE: 0.1 K/UL (ref 0–0.2)
BASOPHILS RELATIVE PERCENT: 0.6 %
BENZODIAZEPINE SCREEN, URINE: ABNORMAL
BILIRUB SERPL-MCNC: 0.8 MG/DL (ref 0–1)
BUN BLDV-MCNC: 18 MG/DL (ref 7–20)
CALCIUM SERPL-MCNC: 10 MG/DL (ref 8.3–10.6)
CANNABINOID SCREEN URINE: ABNORMAL
CHLORIDE BLD-SCNC: 102 MMOL/L (ref 99–110)
CO2: 25 MMOL/L (ref 21–32)
COCAINE METABOLITE SCREEN URINE: ABNORMAL
CREAT SERPL-MCNC: 1 MG/DL (ref 0.9–1.3)
EOSINOPHILS ABSOLUTE: 0.3 K/UL (ref 0–0.6)
EOSINOPHILS RELATIVE PERCENT: 2.1 %
ETHANOL: NORMAL MG/DL (ref 0–0.08)
GFR AFRICAN AMERICAN: >60
GFR NON-AFRICAN AMERICAN: >60
GLOBULIN: 2.9 G/DL
GLUCOSE BLD-MCNC: 98 MG/DL (ref 70–99)
HCT VFR BLD CALC: 46.8 % (ref 40.5–52.5)
HEMOGLOBIN: 16.3 G/DL (ref 13.5–17.5)
LYMPHOCYTES ABSOLUTE: 1.8 K/UL (ref 1–5.1)
LYMPHOCYTES RELATIVE PERCENT: 13.7 %
Lab: ABNORMAL
MCH RBC QN AUTO: 30.6 PG (ref 26–34)
MCHC RBC AUTO-ENTMCNC: 34.8 G/DL (ref 31–36)
MCV RBC AUTO: 87.8 FL (ref 80–100)
METHADONE SCREEN, URINE: ABNORMAL
MONOCYTES ABSOLUTE: 1 K/UL (ref 0–1.3)
MONOCYTES RELATIVE PERCENT: 7.3 %
NEUTROPHILS ABSOLUTE: 10.2 K/UL (ref 1.7–7.7)
NEUTROPHILS RELATIVE PERCENT: 76.3 %
OPIATE SCREEN URINE: ABNORMAL
OXYCODONE URINE: ABNORMAL
PDW BLD-RTO: 12.9 % (ref 12.4–15.4)
PH UA: 5
PHENCYCLIDINE SCREEN URINE: ABNORMAL
PLATELET # BLD: 217 K/UL (ref 135–450)
PMV BLD AUTO: 8.1 FL (ref 5–10.5)
POTASSIUM REFLEX MAGNESIUM: 4 MMOL/L (ref 3.5–5.1)
PROPOXYPHENE SCREEN: ABNORMAL
RBC # BLD: 5.33 M/UL (ref 4.2–5.9)
SALICYLATE, SERUM: <0.3 MG/DL (ref 15–30)
SARS-COV-2, NAAT: NOT DETECTED
SODIUM BLD-SCNC: 139 MMOL/L (ref 136–145)
TOTAL PROTEIN: 7.6 G/DL (ref 6.4–8.2)
WBC # BLD: 13.4 K/UL (ref 4–11)

## 2021-03-15 PROCEDURE — 87635 SARS-COV-2 COVID-19 AMP PRB: CPT

## 2021-03-15 PROCEDURE — 85025 COMPLETE CBC W/AUTO DIFF WBC: CPT

## 2021-03-15 PROCEDURE — 80307 DRUG TEST PRSMV CHEM ANLYZR: CPT

## 2021-03-15 PROCEDURE — 80143 DRUG ASSAY ACETAMINOPHEN: CPT

## 2021-03-15 PROCEDURE — 99284 EMERGENCY DEPT VISIT MOD MDM: CPT

## 2021-03-15 PROCEDURE — 6370000000 HC RX 637 (ALT 250 FOR IP): Performed by: EMERGENCY MEDICINE

## 2021-03-15 PROCEDURE — 80053 COMPREHEN METABOLIC PANEL: CPT

## 2021-03-15 PROCEDURE — 82077 ASSAY SPEC XCP UR&BREATH IA: CPT

## 2021-03-15 PROCEDURE — 80179 DRUG ASSAY SALICYLATE: CPT

## 2021-03-15 RX ORDER — OLANZAPINE 5 MG/1
10 TABLET, ORALLY DISINTEGRATING ORAL ONCE
Status: COMPLETED | OUTPATIENT
Start: 2021-03-15 | End: 2021-03-15

## 2021-03-15 RX ADMIN — OLANZAPINE 10 MG: 5 TABLET, ORALLY DISINTEGRATING ORAL at 01:53

## 2021-03-15 ASSESSMENT — PAIN DESCRIPTION - LOCATION: LOCATION: HEAD

## 2021-03-15 NOTE — ED NOTES
Level of Care Disposition: Discharge    Patient was seen by ED provider and [facility] AN AFFILIATE OF [facility] staff. The case was presented to psychiatric provider on-call Dr. Ysabel Jose. Based on the ED evaluation and information presented to the provider by this writer the decision was made to discharge patient with the following referrals: [facility] and [facility] tx referrals    RATIONALE FOR NON-ADMISSION:  The patient does not meet criteria for an involuntary psychiatric admission because he does not present as an imminent risk to himself or another person. He is currently clinically sober and denies all HI, SI, plan, and intent. He is alert and oriented x4, has linear thoughts, and he answers all questions appropriately. He states he feels much better after the migraine has resolved. He denies all AVH and does not appear to be RTIS. He is future oriented with plans to return to work tomorrow and reschedule his psychiatry appointment. Patient has demonstrated a reasonable safety plan to return home and follow up at [facility].                [address] [place] [address]  03/15/21 8867

## 2021-03-15 NOTE — ED NOTES
Spoke with Mother Isiah Singh) of patient: Pt missed his injection one month ago, and is supposed to be receiving this injection annually once a month. Ruthie Stephenbrandondylon does not what medication pt receives through injection. Pt's symptoms have worsened to the point of pt being unable to care for self. Pt is reportedly experiencing psychosis; hearing voices, talking about god speaking to him, rocking back and forth, repeatedly touching his head, and complaining of severe headache. Ruthie Aschoff is unaware of any recent significant events that may have caused pt's symptoms other than missing his monthly injection. Pt is prescribed this annually injected medication by a Dr. Cherylene Otter with [facility]. Mom reports pt lives alone, and cannot live alone when he experiences psychosis. Pt called brother, who came to see if he was ok, and pt was sitting in the middle of the yard crying. Pt is reluctant to see seek out medical, and will often dismiss, or minimize his symptoms. Pt also reportedly has issues with receiving shots, and does not like needles, and this may be why pt did not get his last monthly injection. Pt has significant hx of traumatic experiences: two children he is unable to see as much as he would like, ex-wife left the relationship, job lost his job a couple years back. Pt has hx of attempted suicide during the breakup with his ex-wife by attempted overdose using heroin. Vicci Aschoff does not feel safe with pt discharge, and feels pt is in need of emergency psychiatric admission. When pt becomes psychotic he is unable to care for self.                 Bernabe Johnson  03/15/21 0066

## 2021-03-15 NOTE — ED PROVIDER NOTES
[address] ED    CHIEF COMPLAINT  Psychiatric Evaluation (states feels like something is picking at head from inside out. gets injection once month and hasn't had  since 2/8/21. mother states pt heearing voices)       HISTORY OF PRESENT ILLNESS  Michael Patiño is a 41 y.o. male who presents to the ED for psychiatric evaluation. Presents with mother who reports patient has been screaming, yelling, arguing, hearing voices. \"This is the worst I've ever seen him. \" Patient reports headache that started around noon today. Gradual in onset. Has had headaches like this previously. Top of head. Poorly characterized. Took Excedrin migraine with some improvement in symptoms. Patient follows with a Dr. Zamzam Gomez / Psychiatrist. He is apparently on an injectable medication for psychiatric condition but cannot recall the name of the medication and missed his last dose. Is due for another dose tomorrow. Denies fever, chest pain, SOB, nausea, vomiting, diarrhea. Denies SI, HI. Mother reports patient has been talking to voices he has been hearing all night. No other complaints, modifying factors or associated symptoms. I have reviewed the following from the nursing documentation:    Past Medical History:   Diagnosis Date    Depression     [facility]ia ([facility]     Schizoaffective disorder, bipolar type ([facility]      Past Surgical History:   Procedure Laterality Date    KNEE SURGERY       History reviewed. No pertinent family history.   Social History     Socioeconomic History    Marital status: Single     Spouse name: Not on file    Number of children: 2    Years of education: 15    Highest education level: Not on file   Occupational History    Not on file   Social Needs    Financial resource strain: Not on file    Food insecurity     Worry: Not on file     Inability: Not on file   [employer] needs     Medical: Not on file     Non-medical: Not on file   Tobacco Use    Smoking status: Current Every Day Smoker Packs/day: 0.50     Types: Cigarettes    Smokeless tobacco: Never Used   Substance and Sexual Activity    Alcohol use: Not Currently     Comment: Patient denies    Drug use: Not Currently     Comment: Patient denies    Sexual activity: Not Currently   Lifestyle    Physical activity     Days per week: Not on file     Minutes per session: Not on file    Stress: Not on file   Relationships    Social connections     Talks on phone: Not on file     Gets together: Not on file     Attends Denominational service: Not on file     Active member of club or organization: Not on file     Attends meetings of clubs or organizations: Not on file     Relationship status: Not on file    Intimate partner violence     Fear of current or ex partner: Not on file     Emotionally abused: Not on file     Physically abused: Not on file     Forced sexual activity: Not on file   Other Topics Concern    Not on file   Social History Narrative    Not on file     No current facility-administered medications for this encounter. Current Outpatient Medications   Medication Sig Dispense Refill    risperiDONE (RISPERDAL) 2 MG tablet Take 1 tablet by mouth nightly for 5 days 5 tablet 0    nicotine (NICODERM CQ) 21 MG/24HR Place 1 patch onto the skin daily 30 patch 0     No Known Allergies    REVIEW OF SYSTEMS  10 systems reviewed, pertinent positives and negatives per HPI, otherwise noted to be negative. PHYSICAL EXAM  ED Triage Vitals [03/15/21 0053]   BP Temp Temp Source Pulse Resp SpO2 Height Weight   (!) 148/96 98.3 °F (36.8 °C) Oral 111 16 97 % 6' (1.829 m) 220 lb (99.8 kg)     General appearance: Awake and alert. Cooperative. Appears anxious, scratching at his head. HENT: Normocephalic. Atraumatic. Mucous membranes are moist.  Neck: Supple. No nuchal rigidity. Eyes: PERRL. EOMI. Heart/Chest: RRR. No murmurs. Lungs: Respirations unlabored. CTAB. Good air exchange. Speaking comfortably in full sentences. Abdomen: Soft. Non-tender. Non-distended. No rebound or guarding. Musculoskeletal: No extremity edema. No deformity. No tenderness in the extremities. All extremities neurovascularly intact. Skin: Warm and dry. No acute rashes. Neurological: Alert and oriented. CN II-XII intact. Strength 5/5 bilateral upper and lower extremities. Sensation intact to light touch. Gait normal.  Psychiatric: Mood/affect: anxious, psychomotor agitation      LABS  I have reviewed all labs for this visit.    Results for orders placed or performed during the hospital encounter of 03/15/21   CBC Auto Differential   Result Value Ref Range    WBC 13.4 (H) 4.0 - 11.0 K/uL    RBC 5.33 4.20 - 5.90 M/uL    Hemoglobin 16.3 13.5 - 17.5 g/dL    Hematocrit 46.8 40.5 - 52.5 %    MCV 87.8 80.0 - 100.0 fL    MCH 30.6 26.0 - 34.0 pg    MCHC 34.8 31.0 - 36.0 g/dL    RDW 12.9 12.4 - 15.4 %    Platelets 173 494 - 001 K/uL    MPV 8.1 5.0 - 10.5 fL    Neutrophils % 76.3 %    Lymphocytes % 13.7 %    Monocytes % 7.3 %    Eosinophils % 2.1 %    Basophils % 0.6 %    Neutrophils Absolute 10.2 (H) 1.7 - 7.7 K/uL    Lymphocytes Absolute 1.8 1.0 - 5.1 K/uL    Monocytes Absolute 1.0 0.0 - 1.3 K/uL    Eosinophils Absolute 0.3 0.0 - 0.6 K/uL    Basophils Absolute 0.1 0.0 - 0.2 K/uL   Comprehensive Metabolic Panel w/ Reflex to MG   Result Value Ref Range    Sodium 139 136 - 145 mmol/L    Potassium reflex Magnesium 4.0 3.5 - 5.1 mmol/L    Chloride 102 99 - 110 mmol/L    CO2 25 21 - 32 mmol/L    Anion Gap 12 3 - 16    Glucose 98 70 - 99 mg/dL    BUN 18 7 - 20 mg/dL    CREATININE 1.0 0.9 - 1.3 mg/dL    GFR Non-African American >60 >60    GFR African American >60 >60    Calcium 10.0 8.3 - 10.6 mg/dL    Total Protein 7.6 6.4 - 8.2 g/dL    Albumin 4.7 3.4 - 5.0 g/dL    Albumin/Globulin Ratio 1.6 1.1 - 2.2    Total Bilirubin 0.8 0.0 - 1.0 mg/dL    Alkaline Phosphatase 69 40 - 129 U/L    ALT 36 10 - 40 U/L    AST 75 (H) 15 - 37 U/L    Globulin 2.9 g/dL   Ethanol   Result Value Ref Range Ethanol Lvl None Detected mg/dL   DRUG SCREEN MULTI URINE   Result Value Ref Range    Amphetamine Screen, Urine POSITIVE (A) Negative <1000ng/mL    Barbiturate Screen, Ur Neg Negative <200 ng/mL    Benzodiazepine Screen, Urine Neg Negative <200 ng/mL    Cannabinoid Scrn, Ur Neg Negative <50 ng/mL    Cocaine Metabolite Screen, Urine Neg Negative <300 ng/mL    Opiate Scrn, Ur Neg Negative <300 ng/mL    PCP Screen, Urine Neg Negative <25 ng/mL    Methadone Screen, Urine Neg Negative <300 ng/mL    Propoxyphene Scrn, Ur Neg Negative <300 ng/mL    Oxycodone Urine Neg Negative <100 ng/ml    pH, UA 5.0     Drug Screen Comment: see below    Acetaminophen (TYLENOL) level   Result Value Ref Range    Acetaminophen Level <5 (L) 10 - 30 ug/mL   Salicylate   Result Value Ref Range    Salicylate, Serum <4.9 (L) 15.0 - 30.0 mg/dL       RADIOLOGY  I have reviewed all radiographic studies for this visit. No orders to display        ED COURSE/MDM  Patient seen and evaluated. Old records reviewed. Labs and imaging reviewed and results discussed with patient/family to extent possible. This is a 22-year-old male presents with concern for psychiatric decompensation in the setting of psychiatric medication noncompliance. On arrival the patient is slightly tachycardic with otherwise reassuring vital signs. He demonstrates psychomotor agitation. He was administered 10 mg Zyprexa ODT with some improvement in his symptoms. He complains of headache however headache is not worst, not first, not thunderclap, and not associated with any neurological deficits. Not concerning for intracranial hemorrhage or dural venous sinus thrombosis. There is no fever or nuchal rigidity and I am not concerned for meningitis. Will obtain usual psychiatric screening laboratory studies and have patient evaluated by [facility]. Renal panel no significant electrolyte abnormalities or creatinine elevation. Ethanol negative.   Acetaminophen and salicylates negative. There is a leukocytosis to 13.4 however this is nonspecific and in the absence of any focus of infection there is no indication for antibiotics at this time. I have performed a medical clearance examination on this patient. It is my opinion that no medical conditions were discovered that would preclude admission to a behavioral health unit or discharge home. I feel that the patient is medically stable for disposition by the behavioral health team at this time. During the patient's ED course, the patient was given:  Medications   OLANZapine zydis (ZYPREXA) disintegrating tablet 10 mg (10 mg Oral Given 3/15/21 0153)        All questions were answered and the patient/family expressed understanding and agreement with the plan. PROCEDURES  None    CRITICAL CARE  N/A    CLINICAL IMPRESSION  1. Schizophrenia, unspecified type ([facility]    2. Agitation        DISPOSITION   Pending per [facility]    Janet Mccloud MD    Note: This chart was created using voice recognition dictation software. Efforts were made by me to ensure accuracy, however some errors may be present due to limitations of this technology and occasionally words are not transcribed correctly.        Janet Mccloud MD  03/15/21 5405

## 2021-03-15 NOTE — ED NOTES
Patient brought back from ED. Patient placed into room and oriented to [facility]. Will continue to monitor patient.       Jimmy Rose RN  03/15/21 2738

## 2021-03-15 NOTE — ED NOTES
Patient has order to discharge home. Patient given discharge instructions. Patient states he understands. Patient left to lobby to await ride.       Palomo London RN  03/15/21 8643

## 2021-03-15 NOTE — ED NOTES
Presenting Problem: Auditory Hallucinations last night exacerbated by a migraine. Pt slept in ED last night and is currently denying all A/V/H as well as HI, SI, plan, and intent. Appearance/Hygiene:  well-appearing, hospital attire, seated in bed, good grooming and good hygiene   Motor Behavior: WNL   Attitude: cooperative  Affect: normal affect   Speech: normal pitch and normal volume  Mood: euthymic   Thought Processes: Goal directed  Perceptions: Absent   Thought content: future oriented    Suicidal ideation:  no specific plan to harm self   Homicidal ideation:  none  Orientation: A&Ox4   Memory: intact  Concentration: Good    Insight/ judgement: normal insight and judgment      Psychosocial and contextual factors: Pt lives at home with a roommate and reports adequate support from his mother. He works full time at his [employer] and would like to be to work tomorrow. C-SSRS Summary (including current and past suicidal ideation, plan, intent, and attempts) : Pt denies all hx of suicide attempts. He denies current SI, plan, and intent. Patient reported diagnosis: Schizophrenia    Outpatient services/ Provider: [facility] with Dr. Felicity Palmer. Pt states he had an appointment today at 11am so he plans to reschedule this as soon as he gets home. Previous Inpatient Admissions( including location and dates if known): 2 admission on [facility] and 3-4 at [facility]    Self-injurious/ Self-harm behavior: Denies    History of violence: Denies    Current Substance use: Pt denies all recent substance abuse. His UDS was positive for amphetamines, however, pt adamantly denies that he used any substances, \"In a few years. \"    Trauma identified: Denies    Access to Firearms: Denies    ASSESSMENT FOR IMMINENT FUTURE DANGER:      RISK FACTORS:    []  Age <25 or >49   [x]  Male gender   []  Depressed mood   []  Active suicidal ideation   []  Suicide plan   []  Suicide attempt   []  Access to lethal means   []  Prior suicide attempt   [x]  Active substance abuse   []  Highly impulsive behaviors   []  Not attending to self-care/ADLs    []  Recent significant loss   []  Chronic pain or medical illness   []  Social isolation   []  History of violence   []  Active psychosis   []  Cognitive impairment    []  No outpatient services in place   []  Medication noncompliance   []  No collateral information to support safety    PROTECTIVE FACTORS:  [x] Age >25 and <55  [] Female gender   [x] Denies depression  [x] Denies suicidal ideation  [x] Does not have lethal plan   [x] Does not have access to guns or weapons  [x] Patient is verbally jillian for safety  [x] No prior suicide attempts  [] No active substance abuse  [x] Patient has social or family support  [] No active psychosis or cognitive dysfunction  [x] Physically healthy  [x] Has outpatient services in place  [] Compliant with recommended medications  [] Collateral information from. ..supports patient safety   [x] Patient is future oriented with plans to reschedule his psychiatry appointment and return to work tomorrow      Clinical Summary:    Patient presents to [facility] voluntarily after he was brought in by his mother. Patient was clinically sober at the time of the evaluation. Patient was evaluated and offered supportive counseling. Pt states he had a migraine yesterday and then he began to hear voices. He denies that the voices were ever command in nature and describes them as, \"Just faint and I couldn't really make them out. \" Pt's UDS was positive for amphetamines. Pt was medicated last evening in ED with Zyprexa 10mg and he slept through the night. This AM pt is calm, cooperative and pleasant. He is alert and oriented x4, has linear thoughts, and he answers all questions appropriately. He states he feels much better after the migraine has resolved. He denies all AVH and does not appear to be RTIS.  Pt adamantly denies that he recently used any amphetamines. He states he recently missed his injectable medication and plans to reschedule his appointment today. Pt is also future oriented with plans to return to work tomorrow. He is not on a SOB and is unwilling to sign himself in on a voluntary basis.           [address] [place] [address]  03/15/21 4884

## 2021-03-15 NOTE — ED PROVIDER NOTES
12:50 PM: I discussed the history, physical examination, laboratory and imaging studies, and treatment plan with Dr. Macario Haque. Elaina Stoddard was signed out to me in stable condition. Please see Dr. Daniels Sers documentation for details of their history, physical, and laboratory studies. Upon re-examination, a summary of Casey Fallon's history, physical examination, and studies are as follows:      78-year-old male with history of schizoaffective disorder, paranoia, and depression presenting for psychiatric evaluation. Patient was reportedly hearing voices and having headaches. Denies SI and HI. Patient was medically cleared pending psychiatry evaluation. On review of labs, patient is Covid negative. Salicylate, acetaminophen, and ethanol levels negative. No electrolyte abnormalities or evidence of kidney dysfunction. Her function testing shows AST elevation, but no other abnormalities on liver function testing. Patient has mild leukocytosis but denies any infectious symptoms, no anemia or thrombocytopenia. Urine drug screen positive for amphetamines. Dr. Halina Almaguer had no acute concerns regarding his headache after evaluation. Reassessment, patient states that he feels well. He denies any headache. Patient had been noted to be tachycardic so I rechecked vitals, pulse is currently 90. Patient cleared for discharge at this time. Patient discharged in stable condition.        Halina Yao MD  03/15/21 1300

## 2021-03-15 NOTE — ED NOTES
Have made multiple attempts to get blood work and urine from pt, continuing to refuse.      Debora Ng RN  03/15/21 1925

## 2021-04-25 ENCOUNTER — HOSPITAL ENCOUNTER (EMERGENCY)
Age: 41
Discharge: HOME | End: 2021-04-25
Attending: EMERGENCY MEDICINE
Payer: COMMERCIAL

## 2021-04-25 VITALS
BODY MASS INDEX: 29.84 KG/M2 | SYSTOLIC BLOOD PRESSURE: 132 MMHG | RESPIRATION RATE: 18 BRPM | DIASTOLIC BLOOD PRESSURE: 86 MMHG | WEIGHT: 220 LBS | HEART RATE: 103 BPM | TEMPERATURE: 98.1 F | OXYGEN SATURATION: 99 %

## 2021-04-25 DIAGNOSIS — S80.212A ABRASION OF LEFT KNEE, INITIAL ENCOUNTER: ICD-10-CM
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
## 2021-04-25 PROCEDURE — 99282 EMERGENCY DEPT VISIT SF MDM: CPT

## 2021-04-25 ASSESSMENT — ENCOUNTER SYMPTOMS
ABDOMINAL PAIN: 0
SHORTNESS OF BREATH: 0
VOMITING: 0
CHEST TIGHTNESS: 0
DIARRHEA: 0
NAUSEA: 0

## 2021-04-25 NOTE — ED NOTES
Patient escorted to the restroom with ED tech at side. Room prepped before patient arrival for patient safety.      Suly Bowie RN  04/25/21 3420
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
## 2021-04-27 ENCOUNTER — HOSPITAL ENCOUNTER (EMERGENCY)
Age: 41
Discharge: HOME | End: 2021-04-27
Payer: COMMERCIAL

## 2021-04-27 VITALS
HEART RATE: 100 BPM | OXYGEN SATURATION: 99 % | RESPIRATION RATE: 16 BRPM | BODY MASS INDEX: 31.15 KG/M2 | TEMPERATURE: 98.6 F | DIASTOLIC BLOOD PRESSURE: 97 MMHG | HEIGHT: 72 IN | WEIGHT: 230 LBS | SYSTOLIC BLOOD PRESSURE: 149 MMHG

## 2021-04-27 DIAGNOSIS — F39 MOOD DISORDER (HCC): Primary | ICD-10-CM

## 2021-04-27 LAB
A/G RATIO: 1.6 (ref 1.1–2.2)
ACETAMINOPHEN LEVEL: <5 UG/ML (ref 10–30)
ALBUMIN SERPL-MCNC: 4.3 G/DL (ref 3.4–5)
ALP BLD-CCNC: 61 U/L (ref 40–129)
ALT SERPL-CCNC: 28 U/L (ref 10–40)
AMPHETAMINE SCREEN, URINE: POSITIVE
ANION GAP SERPL CALCULATED.3IONS-SCNC: 9 MMOL/L (ref 3–16)
AST SERPL-CCNC: 17 U/L (ref 15–37)
BARBITURATE SCREEN URINE: ABNORMAL
BASOPHILS ABSOLUTE: 0.1 K/UL (ref 0–0.2)
BASOPHILS RELATIVE PERCENT: 0.8 %
BENZODIAZEPINE SCREEN, URINE: ABNORMAL
BILIRUB SERPL-MCNC: 0.3 MG/DL (ref 0–1)
BUN BLDV-MCNC: 13 MG/DL (ref 7–20)
CALCIUM SERPL-MCNC: 9.1 MG/DL (ref 8.3–10.6)
CANNABINOID SCREEN URINE: ABNORMAL
CHLORIDE BLD-SCNC: 102 MMOL/L (ref 99–110)
CO2: 25 MMOL/L (ref 21–32)
COCAINE METABOLITE SCREEN URINE: ABNORMAL
CREAT SERPL-MCNC: 0.7 MG/DL (ref 0.9–1.3)
EOSINOPHILS ABSOLUTE: 0.5 K/UL (ref 0–0.6)
EOSINOPHILS RELATIVE PERCENT: 7.1 %
ETHANOL: NORMAL MG/DL (ref 0–0.08)
GFR AFRICAN AMERICAN: >60
GFR NON-AFRICAN AMERICAN: >60
GLOBULIN: 2.7 G/DL
GLUCOSE BLD-MCNC: 94 MG/DL (ref 70–99)
HCT VFR BLD CALC: 45.3 % (ref 40.5–52.5)
HEMOGLOBIN: 15.8 G/DL (ref 13.5–17.5)
LYMPHOCYTES ABSOLUTE: 1 K/UL (ref 1–5.1)
LYMPHOCYTES RELATIVE PERCENT: 13.8 %
Lab: ABNORMAL
MCH RBC QN AUTO: 30.9 PG (ref 26–34)
MCHC RBC AUTO-ENTMCNC: 34.8 G/DL (ref 31–36)
MCV RBC AUTO: 88.6 FL (ref 80–100)
METHADONE SCREEN, URINE: ABNORMAL
MONOCYTES ABSOLUTE: 0.3 K/UL (ref 0–1.3)
MONOCYTES RELATIVE PERCENT: 4.1 %
NEUTROPHILS ABSOLUTE: 5.6 K/UL (ref 1.7–7.7)
NEUTROPHILS RELATIVE PERCENT: 74.2 %
OPIATE SCREEN URINE: ABNORMAL
OXYCODONE URINE: ABNORMAL
PDW BLD-RTO: 12.8 % (ref 12.4–15.4)
PH UA: 5
PHENCYCLIDINE SCREEN URINE: ABNORMAL
PLATELET # BLD: 170 K/UL (ref 135–450)
PMV BLD AUTO: 8.3 FL (ref 5–10.5)
POTASSIUM REFLEX MAGNESIUM: 4 MMOL/L (ref 3.5–5.1)
PROPOXYPHENE SCREEN: ABNORMAL
RBC # BLD: 5.11 M/UL (ref 4.2–5.9)
SALICYLATE, SERUM: <0.3 MG/DL (ref 15–30)
SARS-COV-2, NAAT: NOT DETECTED
SODIUM BLD-SCNC: 136 MMOL/L (ref 136–145)
TOTAL PROTEIN: 7 G/DL (ref 6.4–8.2)
WBC # BLD: 7.6 K/UL (ref 4–11)

## 2021-04-27 PROCEDURE — 80307 DRUG TEST PRSMV CHEM ANLYZR: CPT

## 2021-04-27 PROCEDURE — 87635 SARS-COV-2 COVID-19 AMP PRB: CPT

## 2021-04-27 PROCEDURE — 80053 COMPREHEN METABOLIC PANEL: CPT

## 2021-04-27 PROCEDURE — 80179 DRUG ASSAY SALICYLATE: CPT

## 2021-04-27 PROCEDURE — 99283 EMERGENCY DEPT VISIT LOW MDM: CPT

## 2021-04-27 PROCEDURE — 85025 COMPLETE CBC W/AUTO DIFF WBC: CPT

## 2021-04-27 PROCEDURE — 80143 DRUG ASSAY ACETAMINOPHEN: CPT

## 2021-04-27 PROCEDURE — 82077 ASSAY SPEC XCP UR&BREATH IA: CPT

## 2021-04-27 RX ORDER — ARIPIPRAZOLE 400 MG
400 KIT INTRAMUSCULAR ONCE
COMMUNITY

## 2021-04-27 RX ORDER — ARIPIPRAZOLE 10 MG/1
10 TABLET ORAL DAILY
COMMUNITY

## 2021-04-27 RX ORDER — LORAZEPAM 1 MG/1
1 TABLET ORAL DAILY PRN
COMMUNITY

## 2021-04-27 RX ORDER — BUSPIRONE HYDROCHLORIDE 10 MG/1
10 TABLET ORAL 2 TIMES DAILY
COMMUNITY

## 2021-04-27 ASSESSMENT — ENCOUNTER SYMPTOMS
EYE PAIN: 0
ABDOMINAL PAIN: 0
COUGH: 0
SORE THROAT: 0
BACK PAIN: 0
SHORTNESS OF BREATH: 0
NAUSEA: 0
VOMITING: 0

## 2021-04-27 NOTE — ED NOTES
Patient brought back from triage. Patient changed into safety gown and belongings locked into locker. Patient oriented to [facility]. Will continue to monitor patient.       Nevin Carrion RN  04/27/21 9891

## 2021-04-27 NOTE — LETTER
Gato Cain was seen and treated in our emergency department on 4/27/2021. If you have any questions or concerns, please don't hesitate to call.

## 2021-04-27 NOTE — ED NOTES
loss   []  Chronic pain or medical illness   []  Social isolation   []  History of violence   []  Active psychosis   []  Cognitive impairment    []  No outpatient services in place   []  Medication noncompliance   []  No collateral information to support safety       PROTECTIVE FACTORS:  [x] Age >25 and <55  [] Female gender   [] Denies depression  [x] Denies suicidal ideation  [x] Does not have lethal plan   [x] Does not have access to guns or weapons  [x] Patient is verbally jillian for safety  [x] No prior suicide attempts  [] No active substance abuse  [x] Patient has social or family support  [x] No active psychosis or cognitive dysfunction  [x] Physically healthy  [x] Has outpatient services in place  [x] Compliant with recommended medications  [] Collateral information from. .. supports patient safety   [x] Patient is future oriented with plans to return to work on Wednesday and has appointment scheduled with therapist, Terra on Thursday        Clinical Summary:    Patient presents to [facility]  voluntarily by mother. Patient was clinically sober at the time of the evaluation. Patient was evaluated and offered supportive counseling. Pt presented reporting anxiety and had been hearing voices whispering. Pt presented calm and cooperative during assessment. Pt reported that he sees Dr. Solis Chance for medication management; pt reported that he takes something twice a day and a once monthly injection, last given approximately 3 weeks ago. Pt reported that he has a therapist that he speaks to weekly, Terra on Thursday. Pt reported psychiatric hospitalization at [facility] x 3 and [facility] x 1. Pt reported sleeping approx 6 hours q night, with periodic nightmares. Pt reported appetite WNL. Pt endorsed anxiety that gets worse at times, especially if he forgets to take his medications as scheduled. Pt reported that voices sound like whispers; pt denied command hallucinations.   Pt endorsed at times he talks to the voices to cope. Pt denied abuse hx. Pt denied using of substances despite positive drug screen for amphetamines; pt denied taking stimulate medications to the best of his knowledge. Pt denied current SI/HI. Pt denied prior h/o SI, gestures or attempts.                EZRA Maciel  04/27/21 1047

## 2021-04-27 NOTE — ED NOTES
Patient has order to discharge home. Patient given discharge instructions. Patient states he understands. Patient left to home with mother.       Ena Baum RN  04/27/21 7647

## 2021-04-27 NOTE — ED PROVIDER NOTES
[address] ED  EMERGENCY DEPARTMENT ENCOUNTER        Pt Name: Almaz Bishop  MRN: [id number]  [place] 1980  Date of evaluation: 4/27/2021  Provider: ALVA Pritchard  PCP: No primary care provider on file. HANNAH. I have evaluated this patient. My supervising physician was available for consultation. CHIEF COMPLAINT       Chief Complaint   Patient presents with    Psychiatric Evaluation     Pt statews that he has been hearing voices in his head. Pt's mother has noticed him talking back to the voices. Pt denies SI/HI. HISTORY OF PRESENT ILLNESS   (Location, Timing/Onset, Context/Setting, Quality, Duration, Modifying Factors, Severity, Associated Signs and Symptoms)  Note limiting factors. Almaz Bishop is a 41 y.o. male presents to the emergency department with a chief complaint of auditory hallucinations. Patient reports that since yesterday has been feeling more anxious, has been hearing voices. Does not want to describe the voices further. Has heard similar voices in the past.  Denies any medical complaints including fever, chest pain, shortness of breath, abdominal pain, nausea, vomiting, numbness, weakness. Denies alcohol or other illicit drug use. Denies suicidal or homicidal ideation. Nursing Notes were all reviewed and agreed with or any disagreements were addressed in the HPI. REVIEW OF SYSTEMS    (2-9 systems for level 4, 10 or more for level 5)     Review of Systems   Constitutional: Negative for fever. HENT: Negative for sore throat. Eyes: Negative for pain and visual disturbance. Respiratory: Negative for cough and shortness of breath. Cardiovascular: Negative for chest pain. Gastrointestinal: Negative for abdominal pain, nausea and vomiting. Genitourinary: Negative for dysuria and frequency. Musculoskeletal: Negative for back pain and neck pain. Skin: Negative for rash.    Neurological: Negative for weakness, numbness and headaches. Psychiatric/Behavioral: Positive for hallucinations. Negative for confusion, self-injury, sleep disturbance and suicidal ideas. The patient is nervous/anxious. Positives and Pertinent negatives as per HPI. Except as noted above in the ROS, all other systems were reviewed and negative. PAST MEDICAL HISTORY     Past Medical History:   Diagnosis Date    Depression     [facility]anoia ([facility]     Schizoaffective disorder, bipolar type ([facility] [address] [facility]          SURGICAL HISTORY     Past Surgical History:   Procedure Laterality Date    KNEE SURGERY           CURRENTMEDICATIONS       Discharge Medication List as of 4/27/2021 12:35 PM      CONTINUE these medications which have NOT CHANGED    Details   ARIPiprazole (ABILIFY) 10 MG tablet Take 10 mg by mouth daily Every eveningHistorical Med      LORazepam (ATIVAN) 1 MG tablet Take 1 mg by mouth daily as needed for Anxiety. 6 pills total for prescriptionHistorical Med      busPIRone (BUSPAR) 10 MG tablet Take 10 mg by mouth 2 times dailyHistorical Med      ARIPiprazole ER (ABILIFY MAINTENA) 400 MG PRSY Inject 400 mg into the muscle once Last received about 3 weeks agoHistorical Med               ALLERGIES     Patient has no known allergies. FAMILYHISTORY     History reviewed. No pertinent family history. SOCIAL HISTORY       Social History     Tobacco Use    Smoking status: Former Smoker     Packs/day: 0.50     Types: Cigarettes    Smokeless tobacco: Never Used   Substance Use Topics    Alcohol use: Not Currently     Comment: Patient denies    Drug use: Not Currently     Comment: Patient denies       SCREENINGS             PHYSICAL EXAM    (up to 7 for level 4, 8 or more for level 5)     ED Triage Vitals [04/27/21 0820]   BP Temp Temp Source Pulse Resp SpO2 Height Weight   (!) 137/99 96.6 °F (35.9 °C) Tympanic 89 16 99 % 6' (1.829 m) 230 lb (104.3 kg)       Physical Exam  Vitals signs reviewed.    Constitutional:       Appearance: He is not diaphoretic. HENT:      Nose: No congestion or rhinorrhea. Eyes:      General: No scleral icterus. Conjunctiva/sclera: Conjunctivae normal.   Neck:      Musculoskeletal: Normal range of motion and neck supple. Pulmonary:      Effort: Pulmonary effort is normal. No respiratory distress. Breath sounds: No stridor. Musculoskeletal: Normal range of motion. Skin:     General: Skin is warm and dry. Neurological:      Mental Status: He is alert and oriented to person, place, and time. Psychiatric:         Mood and Affect: Mood normal.         Behavior: Behavior normal.      Comments: Laying comfortably in bed wearing appropriate hospital gown. Sits upright to speak with me. Good eye contact. No obvious signals of attending to internal stimuli. Speech of normal volume, tone, kamar. Denies suicidal or homicidal ideation.          DIAGNOSTIC RESULTS   LABS:    Labs Reviewed   COMPREHENSIVE METABOLIC PANEL W/ REFLEX TO MG FOR LOW K - Abnormal; Notable for the following components:       Result Value    CREATININE 0.7 (*)     All other components within normal limits    Narrative:     Performed at:  [facility],  [place], [place]   Phone [phone]   ACETAMINOPHEN LEVEL - Abnormal; Notable for the following components:    Acetaminophen Level <5 (*)     All other components within normal limits    Narrative:     Performed at:  [facility],  [place], [employer] [employer]   Phone [phone]   SALICYLATE LEVEL - Abnormal; Notable for the following components:    Salicylate, Serum <5.1 (*)     All other components within normal limits    Narrative:     Performed at:  [facility]) - [facility],  [place] [employer] [employer]   Phone [phone]   [address] - Abnormal; Notable for the following components:    Amphetamine Screen, Urine POSITIVE (*)     All other components within normal limits    Narrative:     Performed at:  [facility],  [place], [facility]   Phone [phone], RAPID    Narrative:     Performed at:  [facility],  [place], [facility]   Phone [phone]   CBC WITH AUTO DIFFERENTIAL    Narrative:     Performed at:  [facility],  [place], [facility]   Phone [phone]   ETHANOL    Narrative:     Performed at:  [facility]) - [facility],  [place], [facility]   Phone [phone]       All other labs were within normal range or not returned as of this dictation. EKG: All EKG's are interpreted by the Emergency Department Physician in the absence of a cardiologist.  Please see their note for interpretation of EKG. RADIOLOGY:   Non-plain film images such as CT, Ultrasound and MRI are read by the radiologist. Plain radiographic images are visualized and preliminarily interpreted by the ED Provider with the below findings:        Interpretation per the Radiologist below, if available at the time of this note:    No orders to display     No results found. PROCEDURES   Unless otherwise noted below, none     Procedures    CRITICAL CARE TIME   N/A    CONSULTS:  None      EMERGENCY DEPARTMENT COURSE and DIFFERENTIAL DIAGNOSIS/MDM:   Vitals:    Vitals:    04/27/21 0820 04/27/21 1231   BP: (!) 137/99 (!) 149/97   Pulse: 89 100   Resp: 16 16   Temp: 96.6 °F (35.9 °C) 98.6 °F (37 °C)   TempSrc: Tympanic Infrared   SpO2: 99% 99%   Weight: 230 lb (104.3 kg)    Height: 6' (1.829 m)        Patient was given the following medications:  Medications - No data to display        29-year-old male presents the emergency department for auditory hallucinations. Has had this in the past, carries a diagnosis of schizoaffective disorder bipolar type.   He denies any medical complaints. Medical screening labs ordered, when they return I expect to medically clear the patient for psychiatric evaluation. Labs notable for urine drug screen with positive amphetamines. Otherwise unremarkable for emergent etiology. Medically cleared. I evaluated patient, cleared the patient for discharge. They provided follow-up information for the patient. Patient denies any other needs on repeat evaluation. Instructed to return emergency room for any new or worsening symptoms including but not limited to suicidal or homicidal ideation, fever, chest pain, shortness of breath, any other symptoms he is concerned are emergent. FINAL IMPRESSION      1. Mood disorder [facility])          DISPOSITION/PLAN   DISPOSITION Decision To Discharge 04/27/2021 12:15:49 PM      PATIENT REFERREDTO:  No follow-up provider specified.     DISCHARGE MEDICATIONS:  Discharge Medication List as of 4/27/2021 12:35 PM          DISCONTINUED MEDICATIONS:  Discharge Medication List as of 4/27/2021 12:35 PM                 (Please note that portions of this note were completed with a voice recognition program.  Efforts were made to edit the dictations but occasionally words are mis-transcribed.)    ALVA Grissom (electronically signed)         ALVA Grissom  04/27/21 2010

## 2021-04-27 NOTE — ED NOTES
Call placed to [facility] in [place]. Medications confirmed.       Nabila Hoffman RN  04/27/21 7472

## 2021-04-27 NOTE — ED NOTES
Level of Care Disposition: Discharge      Patient was seen by ED provider and [facility] staff. The case was presented to psychiatric provider on-call Dr. Florecita Faust. Based on the ED evaluation and information presented to the provider by this writer, it is the recommendation of the on call psychiatric provider that the patient be discharged from a psychiatric standpoint with the following referrals: [facility]       RATIONALE FOR NON-ADMISSION:  The patient does not meet criteria for an involuntary psychiatric admission because the pt does not present as an imminent risk to himself or another person. He is denying active HI, SI, plan, and intent. He is future oriented with plans to return to work tomorrow and to follow up at his counseling appointment on 4/29/21. Patient has demonstrated a reasonable safety plan to return home and follow up with outpt tx.                [address] [place] [address]  04/27/21 3168

## 2022-01-07 ENCOUNTER — OFFICE VISIT (OUTPATIENT)
Dept: INTERNAL MEDICINE CLINIC | Age: 42
End: 2022-01-07
Payer: COMMERCIAL

## 2022-01-07 VITALS
WEIGHT: 289.2 LBS | OXYGEN SATURATION: 99 % | HEART RATE: 92 BPM | TEMPERATURE: 97.5 F | BODY MASS INDEX: 39.17 KG/M2 | SYSTOLIC BLOOD PRESSURE: 150 MMHG | HEIGHT: 72 IN | DIASTOLIC BLOOD PRESSURE: 100 MMHG

## 2022-01-07 DIAGNOSIS — F20.9 SCHIZOPHRENIA, UNSPECIFIED TYPE (HCC): ICD-10-CM

## 2022-01-07 DIAGNOSIS — Z11.59 NEED FOR HEPATITIS C SCREENING TEST: ICD-10-CM

## 2022-01-07 DIAGNOSIS — Z13.220 SCREENING, LIPID: ICD-10-CM

## 2022-01-07 DIAGNOSIS — Z76.89 ENCOUNTER TO ESTABLISH CARE WITH NEW DOCTOR: Primary | ICD-10-CM

## 2022-01-07 DIAGNOSIS — Z11.4 SCREENING FOR HIV (HUMAN IMMUNODEFICIENCY VIRUS): ICD-10-CM

## 2022-01-07 DIAGNOSIS — F22 PARANOIA (HCC): ICD-10-CM

## 2022-01-07 DIAGNOSIS — I10 PRIMARY HYPERTENSION: ICD-10-CM

## 2022-01-07 DIAGNOSIS — Z71.85 VACCINE COUNSELING: ICD-10-CM

## 2022-01-07 DIAGNOSIS — Z78.9 ELECTRONIC CIGARETTE USE: ICD-10-CM

## 2022-01-07 PROBLEM — F25.0 SCHIZOAFFECTIVE DISORDER, BIPOLAR TYPE (HCC): Status: ACTIVE | Noted: 2022-01-07

## 2022-01-07 PROCEDURE — 99386 PREV VISIT NEW AGE 40-64: CPT | Performed by: NURSE PRACTITIONER

## 2022-01-07 PROCEDURE — G8484 FLU IMMUNIZE NO ADMIN: HCPCS | Performed by: NURSE PRACTITIONER

## 2022-01-07 RX ORDER — BLOOD PRESSURE TEST KIT
KIT MISCELLANEOUS
Qty: 1 KIT | Refills: 0 | Status: SHIPPED | OUTPATIENT
Start: 2022-01-07

## 2022-01-07 ASSESSMENT — PATIENT HEALTH QUESTIONNAIRE - PHQ9
SUM OF ALL RESPONSES TO PHQ QUESTIONS 1-9: 0
2. FEELING DOWN, DEPRESSED OR HOPELESS: 0
SUM OF ALL RESPONSES TO PHQ QUESTIONS 1-9: 0
1. LITTLE INTEREST OR PLEASURE IN DOING THINGS: 0
SUM OF ALL RESPONSES TO PHQ9 QUESTIONS 1 & 2: 0
SUM OF ALL RESPONSES TO PHQ QUESTIONS 1-9: 0
SUM OF ALL RESPONSES TO PHQ QUESTIONS 1-9: 0

## 2022-01-07 ASSESSMENT — ENCOUNTER SYMPTOMS
COUGH: 0
VOMITING: 0
DIARRHEA: 0
ABDOMINAL PAIN: 0
SHORTNESS OF BREATH: 0
NAUSEA: 0
SORE THROAT: 0
WHEEZING: 0
CONSTIPATION: 0
SINUS PAIN: 0

## 2022-01-07 NOTE — PATIENT INSTRUCTIONS
Goal BP <130/80  Take BP daily and keep a log. Bring your BP log back to the next appt. Call with consistently elevated readings prior to your next appt. Please get your fasting lab work (no food or drink for 10-12 hours prior besides water) completed M-F 730a-4p at our office. [facility] lab has walk-in hours available as well - they are open Saturday 7a-3p - no appointment is needed. We will call with your results. Patient Education   DASH Diet: Care Instructions  Your Care Instructions     The DASH diet is an eating plan that can help lower your blood pressure. DASH stands for Dietary Approaches to Stop Hypertension. Hypertension is high blood pressure. The DASH diet focuses on eating foods that are high in calcium, potassium, and magnesium. These nutrients can lower blood pressure. The foods that are highest in these nutrients are fruits, vegetables, low-fat dairy products, nuts, seeds, and legumes. But taking calcium, potassium, and magnesium supplements instead of eating foods that are high in those nutrients does not have the same effect. The DASH diet also includes whole grains, fish, and poultry. The DASH diet is one of several lifestyle changes your doctor may recommend to lower your high blood pressure. Your doctor may also want you to decrease the amount of sodium in your diet. Lowering sodium while following the DASH diet can lower blood pressure even further than just the DASH diet alone. Follow-up care is a key part of your treatment and safety. Be sure to make and go to all appointments, and call your doctor if you are having problems. It's also a good idea to know your test results and keep a list of the medicines you take. How can you care for yourself at home? Following the DASH diet  · Eat 4 to 5 servings of fruit each day. A serving is 1 medium-sized piece of fruit, ½ cup chopped or canned fruit, 1/4 cup dried fruit, or 4 ounces (½ cup) of fruit juice.  Choose fruit more often than fruit juice. · Eat 4 to 5 servings of vegetables each day. A serving is 1 cup of lettuce or raw leafy vegetables, ½ cup of chopped or cooked vegetables, or 4 ounces (½ cup) of vegetable juice. Choose vegetables more often than vegetable juice. · Get 2 to 3 servings of low-fat and fat-free dairy each day. A serving is 8 ounces of milk, 1 cup of yogurt, or 1 ½ ounces of cheese. · Eat 6 to 8 servings of grains each day. A serving is 1 slice of bread, 1 ounce of dry cereal, or ½ cup of cooked rice, pasta, or cooked cereal. Try to choose whole-grain products as much as possible. · Limit lean meat, poultry, and fish to 2 servings each day. A serving is 3 ounces, about the size of a deck of cards. · Eat 4 to 5 servings of nuts, seeds, and legumes (cooked dried beans, lentils, and split peas) each week. A serving is 1/3 cup of nuts, 2 tablespoons of seeds, or ½ cup of cooked beans or peas. · Limit fats and oils to 2 to 3 servings each day. A serving is 1 teaspoon of vegetable oil or 2 tablespoons of salad dressing. · Limit sweets and added sugars to 5 servings or less a week. A serving is 1 tablespoon jelly or jam, ½ cup sorbet, or 1 cup of lemonade. · Eat less than 2,300 milligrams (mg) of sodium a day. If you limit your sodium to 1,500 mg a day, you can lower your blood pressure even more. · Be aware that all of these are the suggested number of servings for people who eat 1,800 to 2,000 calories a day. Your recommended number of servings may be different if you need more or fewer calories. Tips for success  · Start small. Do not try to make dramatic changes to your diet all at once. You might feel that you are missing out on your favorite foods and then be more likely to not follow the plan. Make small changes, and stick with them. Once those changes become habit, add a few more changes. · Try some of the following:  ? Make it a goal to eat a fruit or vegetable at every meal and at snacks.  This will make it easy to get the recommended amount of fruits and vegetables each day. ? Try yogurt topped with fruit and nuts for a snack or healthy dessert. ? Add lettuce, tomato, cucumber, and onion to sandwiches. ? Combine a ready-made pizza crust with low-fat mozzarella cheese and lots of vegetable toppings. Try using tomatoes, squash, spinach, broccoli, carrots, cauliflower, and onions. ? Have a variety of cut-up vegetables with a low-fat dip as an appetizer instead of chips and dip. ? Sprinkle sunflower seeds or chopped almonds over salads. Or try adding chopped walnuts or almonds to cooked vegetables. ? Try some vegetarian meals using beans and peas. Add garbanzo or kidney beans to salads. Make burritos and tacos with mashed escalante beans or black beans. Where can you learn more? Go to https://EUDOWEB.[employer]. org and sign in to your [employer] account. Enter K528 in the HipLink box to learn more about \"DASH Diet: Care Instructions. \"     If you do not have an account, please click on the \"Sign Up Now\" link. Current as of: April 29, 2021               Content Version: 13.1  © 8587-5378 [employer]. Care instructions adapted under license by [facility] [facility] If you have questions about a medical condition or this instruction, always ask your healthcare professional. [place] [address] any warranty or liability for your use of this information.

## 2022-01-07 NOTE — PROGRESS NOTES
New Patient Office Visit  1/7/2022    Subjective:  Chief Complaint   Patient presents with    Established New Doctor     HPI:  Gustavo Champion is a 35 y.o. male who presents to the clinic today to establish care.    hypertension- used to take medication but states \"it made me feels weird so I stopped taking it. \" States this was 15-20 years ago. Denies chest pain, palpitations, shortness of breath, trouble breathing, lightheadedness, dizziness or blurred vision. Takes Abilify and sees psychiatry and psychology. States he is doing well on his regimen. He would like a psychiatrist closer to home. Denies SI/HI. On disability- for paranoid schizophrenia. Watches TV and does yard work for fun. Lives alone. Review of Systems   Constitutional: Negative for chills, fatigue and fever. HENT: Negative for congestion, postnasal drip, sinus pain and sore throat. Respiratory: Negative for cough, shortness of breath and wheezing. Cardiovascular: Negative for chest pain, palpitations and leg swelling. Gastrointestinal: Negative for abdominal pain, constipation, diarrhea, nausea and vomiting. Genitourinary: Negative for dysuria, frequency, hematuria and urgency. Musculoskeletal: Negative for myalgias. Skin: Negative for pallor and rash. Neurological: Negative for dizziness, weakness, light-headedness, numbness and headaches. Psychiatric/Behavioral: Negative for dysphoric mood, sleep disturbance and suicidal ideas. The patient is not nervous/anxious. No Known Allergies     Family History   Problem Relation Age of Onset    Heart Disease Mother     Prostate Cancer Father     Heart Attack Neg Hx      Current Outpatient Rx   Medication Sig Dispense Refill    Blood Pressure KIT Take BP daily. 1 kit 0    ARIPiprazole (ABILIFY) 10 MG tablet Take 10 mg by mouth daily Every evening      LORazepam (ATIVAN) 1 MG tablet Take 1 mg by mouth daily as needed for Anxiety.  6 pills total for prescription      busPIRone (BUSPAR) 10 MG tablet Take 10 mg by mouth 2 times daily      ARIPiprazole ER (ABILIFY MAINTENA) 400 MG PRSY Inject 400 mg into the muscle once Last received about 3 weeks ago       Social History     Socioeconomic History    Marital status: Single     Spouse name: Not on file    Number of children: 2    Years of education: 15    Highest education level: Not on file   Occupational History    Not on file   Tobacco Use    Smoking status: Former Smoker     Packs/day: 0.50     Years: 8.00     Pack years: 4.00     Types: Cigarettes     Quit date:      Years since quittin.0    Smokeless tobacco: Never Used   Vaping Use    Vaping Use: Every day    Substances: Nicotine   Substance and Sexual Activity    Alcohol use: Not Currently     Comment: Patient denies    Drug use: Not Currently     Comment: Patient denies    Sexual activity: Not Currently   Other Topics Concern    Not on file   Social History Narrative    In disability- for paranoid schizophrenia. Watches TV and does yard work for fun. Lives alone. Social Determinants of Health     Financial Resource Strain:     Difficulty of Paying Living Expenses: Not on file   Food Insecurity:     Worried About Running Out of Food in the Last Year: Not on file    Diaz of Food in the Last Year: Not on file   Transportation Needs:     Lack of Transportation (Medical): Not on file    Lack of Transportation (Non-Medical):  Not on file   Physical Activity:     Days of Exercise per Week: Not on file    Minutes of Exercise per Session: Not on file   Stress:     Feeling of Stress : Not on file   Social Connections:     Frequency of Communication with Friends and Family: Not on file    Frequency of Social Gatherings with Friends and Family: Not on file    Attends Temple Services: Not on file    Active Member of Clubs or Organizations: Not on file    Attends Club or Organization Meetings: Not on file    Marital Status: Not on file Intimate Partner Violence:     Fear of Current or Ex-Partner: Not on file    Emotionally Abused: Not on file    Physically Abused: Not on file    Sexually Abused: Not on file   Housing Stability:     Unable to Pay for Housing in the Last Year: Not on file    Number of [place] in the Last Year: Not on file    Unstable Housing in the Last Year: Not on file     Past Medical History:   Diagnosis Date    Depression     [facility] ([facility]     Primary hypertension 1/7/2022    Schizoaffective disorder, bipolar type ([facility]      Patient Active Problem List   Diagnosis    Psychosis ([facility]    Leukocytosis    Tobacco abuse    Primary hypertension    Schizoaffective disorder, bipolar type ([facility]    Paranoia ([facility]      Wt Readings from Last 3 Encounters:   01/07/22 289 lb 3.2 oz (131.2 kg)   04/27/21 230 lb (104.3 kg)   04/25/21 220 lb (99.8 kg)     BP Readings from Last 3 Encounters:   01/07/22 (!) 150/100   04/27/21 (!) 149/97   04/25/21 132/86     The 10-year ASCVD risk score (Elo Pereyra, et al., 2013) is: 2.7%    Values used to calculate the score:      Age: 35 years      Sex: Male      Is Non- : No      Diabetic: No      Tobacco smoker: No      Systolic Blood Pressure: 161 mmHg      Is BP treated: No      HDL Cholesterol: 44 mg/dL      Total Cholesterol: 209 mg/dL    PHQ-9 Total Score: 0 (1/7/2022  1:00 PM)    Vitals 1/7/2022 8/1/8059   SYSTOLIC 684 [id number]   DIASTOLIC 670 98     Objective/Physical Exam:  BP (!) 150/100 (Site: Right Upper Arm, Position: Sitting)   Pulse 92   Temp 97.5 °F (36.4 °C) (Temporal)   Ht 6' (1.829 m)   Wt 289 lb 3.2 oz (131.2 kg)   SpO2 99%   BMI 39.22 kg/m²   Body mass index is 39.22 kg/m². Physical Exam  Vitals reviewed. Constitutional:       General: He is not in acute distress. Appearance: He is well-developed. He is not diaphoretic. HENT:      Head: Normocephalic and atraumatic.       Right Ear: Tympanic membrane and external ear normal. Left Ear: Tympanic membrane and external ear normal.   Eyes:      Conjunctiva/sclera: Conjunctivae normal.      Pupils: Pupils are equal, round, and reactive to light. Cardiovascular:      Rate and Rhythm: Normal rate and regular rhythm. Pulmonary:      Effort: Pulmonary effort is normal. No respiratory distress. Breath sounds: Normal breath sounds. No wheezing or rales. Chest:      Chest wall: No tenderness. Abdominal:      General: Bowel sounds are normal. There is no distension. Palpations: Abdomen is soft. Tenderness: There is no abdominal tenderness. There is no guarding. Skin:     General: Skin is warm and dry. Neurological:      Mental Status: He is alert and oriented to person, place, and time. Coordination: Coordination normal.   Psychiatric:         Mood and Affect: Mood normal.       Assessment and Plan:  Jennifer Ray was seen today for established new doctor. Diagnoses and all orders for this visit:    Encounter to establish care with new doctor  -     Comprehensive Metabolic Panel; Future  -     CBC Auto Differential; [place] formal exercise. - Pt reports his diet is \"average, but I uncontrollablly eat stuff. \"  - Feels safe in home and in relationships.  - Wears his seatbelt in the car. Schizophrenia, unspecified type (HCC)/Paranoia [facility])  -    Patient would like a new psychiatrist closer to home. He states his mood is doing well. His PHQ-9 is 0. Denies suicidal or homicidal ideation.  - TSH WITH REFLEX TO FT4; Future  -     External Referral to Psychiatry    Electronic cigarette use   - Cessation recommended. Patient not ready to quit at this time. Screening, lipid  -     Lipid, Fasting; Future    Screening for HIV (human immunodeficiency virus)  -    Asymptomatic. Patient agreeable  - HIV Screen; Future    Need for hepatitis C screening test  -    Asymptomatic. Patient agreeable   - HEPATITIS C ANTIBODY;  Future    Vaccine counseling   - CDC guidelines regarding COVID-19 vaccine reviewed. Risks versus benefits were reviewed. Primary hypertension  -    Blood pressure elevated today. Reports he used to take blood pressure medications- Stopped on his own. Asymptomatic.  - Reviewed medications and lifestyle modifications. Patient reports he would like to trial lifestyle modifications and monitor his blood pressure at home. Blood pressure cuff sent to the patient's pharmacy. Recommend the patient monitor blood pressure at home. Goal blood pressure reviewed. Patient will call with elevated readings prior to the next visit and he will bring his blood pressure log to the next visit. - Patient education handout on the DASH diet provided and reviewed with the patient. - Lifestyle modifications such as exercise, weight loss and healthy diet encouraged and reviewed with the pt. - Blood Pressure KIT; Take BP daily.  - Pt will call if symptoms worsen or fail to improve  - Red flag warning signs reviewed with the pt and he will go to the ER if these occur. Return in about 2 weeks (around 1/21/2022) for Bp f/u, or sooner if needed. Pt will call if symptoms worsen or fail to improve. All questions answered. Pt states no further questions or concerns at this time.    Electronically signed by: ALEXANDR Taylor - MARQUES 01/07/22

## 2022-02-10 ENCOUNTER — HOSPITAL ENCOUNTER (OUTPATIENT)
Age: 42
Discharge: HOME | End: 2022-02-10
Payer: MEDICARE

## 2022-02-10 DIAGNOSIS — Z13.220 SCREENING, LIPID: ICD-10-CM

## 2022-02-10 DIAGNOSIS — Z11.4 SCREENING FOR HIV (HUMAN IMMUNODEFICIENCY VIRUS): ICD-10-CM

## 2022-02-10 DIAGNOSIS — Z76.89 ENCOUNTER TO ESTABLISH CARE WITH NEW DOCTOR: ICD-10-CM

## 2022-02-10 DIAGNOSIS — F20.9 SCHIZOPHRENIA, UNSPECIFIED TYPE (HCC): ICD-10-CM

## 2022-02-10 DIAGNOSIS — Z11.59 NEED FOR HEPATITIS C SCREENING TEST: ICD-10-CM

## 2022-02-10 LAB
A/G RATIO: 1.6 (ref 1.1–2.2)
ALBUMIN SERPL-MCNC: 4.2 G/DL (ref 3.4–5)
ALP BLD-CCNC: 77 U/L (ref 40–129)
ALT SERPL-CCNC: 30 U/L (ref 10–40)
ANION GAP SERPL CALCULATED.3IONS-SCNC: 16 MMOL/L (ref 3–16)
AST SERPL-CCNC: 15 U/L (ref 15–37)
BASOPHILS ABSOLUTE: 0.1 K/UL (ref 0–0.2)
BASOPHILS RELATIVE PERCENT: 0.8 %
BILIRUB SERPL-MCNC: 0.4 MG/DL (ref 0–1)
BUN BLDV-MCNC: 10 MG/DL (ref 7–20)
CALCIUM SERPL-MCNC: 8.9 MG/DL (ref 8.3–10.6)
CHLORIDE BLD-SCNC: 103 MMOL/L (ref 99–110)
CHOLESTEROL, FASTING: 236 MG/DL (ref 0–199)
CO2: 22 MMOL/L (ref 21–32)
CREAT SERPL-MCNC: 0.7 MG/DL (ref 0.9–1.3)
EOSINOPHILS ABSOLUTE: 0.7 K/UL (ref 0–0.6)
EOSINOPHILS RELATIVE PERCENT: 7.7 %
GFR AFRICAN AMERICAN: >60
GFR NON-AFRICAN AMERICAN: >60
GLUCOSE BLD-MCNC: 103 MG/DL (ref 70–99)
HCT VFR BLD CALC: 45.8 % (ref 40.5–52.5)
HDLC SERPL-MCNC: 43 MG/DL (ref 40–60)
HEMOGLOBIN: 15.6 G/DL (ref 13.5–17.5)
HEPATITIS C ANTIBODY INTERPRETATION: NORMAL
LDL CHOLESTEROL CALCULATED: 171 MG/DL
LYMPHOCYTES ABSOLUTE: 1.3 K/UL (ref 1–5.1)
LYMPHOCYTES RELATIVE PERCENT: 13.4 %
MCH RBC QN AUTO: 29.2 PG (ref 26–34)
MCHC RBC AUTO-ENTMCNC: 34.1 G/DL (ref 31–36)
MCV RBC AUTO: 85.7 FL (ref 80–100)
MONOCYTES ABSOLUTE: 0.5 K/UL (ref 0–1.3)
MONOCYTES RELATIVE PERCENT: 4.8 %
NEUTROPHILS ABSOLUTE: 7.1 K/UL (ref 1.7–7.7)
NEUTROPHILS RELATIVE PERCENT: 73.3 %
PDW BLD-RTO: 13.4 % (ref 12.4–15.4)
PLATELET # BLD: 209 K/UL (ref 135–450)
PMV BLD AUTO: 8.5 FL (ref 5–10.5)
POTASSIUM SERPL-SCNC: 4.3 MMOL/L (ref 3.5–5.1)
RBC # BLD: 5.35 M/UL (ref 4.2–5.9)
SODIUM BLD-SCNC: 141 MMOL/L (ref 136–145)
TOTAL PROTEIN: 6.9 G/DL (ref 6.4–8.2)
TRIGLYCERIDE, FASTING: 110 MG/DL (ref 0–150)
TSH REFLEX FT4: 1.14 UIU/ML (ref 0.27–4.2)
VLDLC SERPL CALC-MCNC: 22 MG/DL
WBC # BLD: 9.6 K/UL (ref 4–11)

## 2022-02-10 PROCEDURE — 85025 COMPLETE CBC W/AUTO DIFF WBC: CPT

## 2022-02-10 PROCEDURE — 87390 HIV-1 AG IA: CPT

## 2022-02-10 PROCEDURE — 36415 COLL VENOUS BLD VENIPUNCTURE: CPT

## 2022-02-10 PROCEDURE — 86701 HIV-1ANTIBODY: CPT

## 2022-02-10 PROCEDURE — 84443 ASSAY THYROID STIM HORMONE: CPT

## 2022-02-10 PROCEDURE — 80053 COMPREHEN METABOLIC PANEL: CPT

## 2022-02-10 PROCEDURE — 80061 LIPID PANEL: CPT

## 2022-02-10 PROCEDURE — 86702 HIV-2 ANTIBODY: CPT

## 2022-02-10 PROCEDURE — 86803 HEPATITIS C AB TEST: CPT

## 2022-02-11 DIAGNOSIS — R73.09 ELEVATED GLUCOSE: ICD-10-CM

## 2022-02-11 DIAGNOSIS — R73.09 ELEVATED GLUCOSE: Primary | ICD-10-CM

## 2022-02-11 LAB
HIV AG/AB: NORMAL
HIV ANTIGEN: NORMAL
HIV-1 ANTIBODY: NORMAL
HIV-2 AB: NORMAL

## 2022-02-12 LAB
ESTIMATED AVERAGE GLUCOSE: 96.8 MG/DL
HBA1C MFR BLD: 5 %

## 2024-01-04 ENCOUNTER — TELEPHONE (OUTPATIENT)
Dept: INTERNAL MEDICINE CLINIC | Age: 44
End: 2024-01-04

## 2024-01-04 NOTE — TELEPHONE ENCOUNTER
[employer]  home calling in asking about the patient's death certificate that was dropped off last week.    They can be reached at [phone].

## 2024-01-05 NOTE — TELEPHONE ENCOUNTER
I called and spoke with  on Tuesday.      is aware that I saw this patient 1 time almost 2 years ago and he then no showed, never followed up and never responded to the out reach on [place].

## 2024-02-05 ENCOUNTER — TELEPHONE (OUTPATIENT)
Dept: INTERNAL MEDICINE CLINIC | Age: 44
End: 2024-02-05

## 2024-02-05 NOTE — TELEPHONE ENCOUNTER
[facility]on[facility] office investigator #  [id number] needs to clarify a diagnosis found on patient records. States he needs clarification if patient had diagnosis of HIV please /Bridget  - fax pending for record release.